# Patient Record
Sex: FEMALE | Race: WHITE | Employment: PART TIME | URBAN - METROPOLITAN AREA
[De-identification: names, ages, dates, MRNs, and addresses within clinical notes are randomized per-mention and may not be internally consistent; named-entity substitution may affect disease eponyms.]

---

## 2017-07-24 RX ORDER — LIOTHYRONINE SODIUM 5 UG/1
5 TABLET ORAL 2 TIMES DAILY
Qty: 180 TAB | Refills: 3 | OUTPATIENT
Start: 2017-07-24

## 2017-07-24 RX ORDER — LIOTHYRONINE SODIUM 5 UG/1
TABLET ORAL
Qty: 180 TAB | Refills: 3 | Status: SHIPPED | OUTPATIENT
Start: 2017-07-24 | End: 2018-07-06 | Stop reason: SDUPTHER

## 2017-07-24 NOTE — TELEPHONE ENCOUNTER
From: Ko Cool  To: Dee Banda MD  Sent: 7/23/2017 9:43 PM EDT  Subject: Medication Renewal Request    Original authorizing provider: MD Mateus Horton.  [place] would like a refill of the following medications:  liothyronine (CYTOMEL) 5 mcg tablet Dee Banda MD]    Preferred pharmacy: [address] [place], [place] [address]n [facility] 76:

## 2017-10-18 ENCOUNTER — OFFICE VISIT (OUTPATIENT)
Dept: FAMILY MEDICINE CLINIC | Age: 42
End: 2017-10-18

## 2017-10-18 ENCOUNTER — TELEPHONE (OUTPATIENT)
Dept: FAMILY MEDICINE CLINIC | Age: 42
End: 2017-10-18

## 2017-10-18 VITALS
OXYGEN SATURATION: 99 % | HEIGHT: 61 IN | WEIGHT: 181 LBS | HEART RATE: 68 BPM | SYSTOLIC BLOOD PRESSURE: 110 MMHG | BODY MASS INDEX: 34.17 KG/M2 | DIASTOLIC BLOOD PRESSURE: 77 MMHG | TEMPERATURE: 98.2 F

## 2017-10-18 DIAGNOSIS — M62.838 TRAPEZIUS MUSCLE SPASM: ICD-10-CM

## 2017-10-18 DIAGNOSIS — M79.18 MYOFASCIAL PAIN: Primary | ICD-10-CM

## 2017-10-18 DIAGNOSIS — G44.209 TENSION-TYPE HEADACHE, NOT INTRACTABLE, UNSPECIFIED CHRONICITY PATTERN: ICD-10-CM

## 2017-10-18 DIAGNOSIS — M54.50 BILATERAL LOW BACK PAIN WITHOUT SCIATICA, UNSPECIFIED CHRONICITY: ICD-10-CM

## 2017-10-18 DIAGNOSIS — M62.838 NECK MUSCLE SPASM: ICD-10-CM

## 2017-10-18 RX ORDER — CYCLOBENZAPRINE HCL 5 MG
5 TABLET ORAL
Qty: 90 TAB | Refills: 2 | Status: SHIPPED | OUTPATIENT
Start: 2017-10-18 | End: 2017-10-18 | Stop reason: SDUPTHER

## 2017-10-18 RX ORDER — CYCLOBENZAPRINE HCL 5 MG
5 TABLET ORAL
Qty: 90 TAB | Refills: 2 | Status: SHIPPED | OUTPATIENT
Start: 2017-10-18 | End: 2018-04-19

## 2017-10-18 NOTE — TELEPHONE ENCOUNTER
Patient says that [address] [place] [address] has not got the flexeril that was supposed to be called in this morning.  She can be reached at  [phone]

## 2017-10-18 NOTE — PROGRESS NOTES
HPI  Sebas Gomez is a 42 y.o. female who presents with headache, neck pain, trapezius pain that has been bothering her for the last week. She has checked her blood pressure a few times during these episodes and notices that they are 150/90 and that specifically is what she is concerned about. She has had migraines in the past.  These are typically on the left side and with visual sensitivity and nausea. No aura to speak of. Has been taking propranolol for several years and now only has migraines a few times per year but is not sure when she had the last one. This headache that she is currently complaining about started about a week ago when her menstrual cycle started. Patient describes it as bilateral and viselike. Points to the bilateral trapezius and the back of the neck and says that it starts there and moves up over the top of her head and then makes a constricting heel motion around the sides of her head. Also notes that every now and then her ears would feel tingling. The pattern of the headache comes and goes. Will come on for about 23 hours and go away for about 30 minutes. If she sitting on the couch and not moving she can get the headache to go away. Once she gets up and gets moving again the headache comes back on. Also notices that it is worse with stress and when she is busy at work. Advil did not make much of a difference but ice on the top of her head and going to sleep did help. She has been able to sleep. There are other symptoms that she brought up. She notices that when she squats with her bottom to her heels for an extended period of time and then stands up she will get lightheaded as though the blood is rushing to her feet. This is not typical for her but has only happened 2 times in the past week. She also notices that there is a sort of swishing sound in her ears every now and then with certain head movements.     Tells me that she has been under an unusual amount of stress recently. Her mother  about this time last year and 2 weeks ago she lost a friend. She has a history of postpartum depression and was on SSRI at some point but was weaned off of it (on her own). She cannot recall which medication but does recall that she had been on clonazepam at one point. Has not had any medications for decades. She does have a history of bilateral low back discomfort that is almost always there and will occasionally tingle to her anterior thigh. Notices that this also got worse about the same time as the headache. Has been given Flexeril in the past for this and she took 5 mg Flexeril last night and that helped her sleep. Has a family history of all the women in her family having fibromyalgia    PMHx:  Past Medical History:   Diagnosis Date    HTN (hypertension)     Migraine     Nausea & vomiting     Thyroid disease     hypothyroid       Meds:   Current Outpatient Prescriptions   Medication Sig Dispense Refill    cyclobenzaprine (FLEXERIL) 5 mg tablet Take 1 Tab by mouth three (3) times daily as needed for Muscle Spasm(s). 90 Tab 2    liothyronine (CYTOMEL) 5 mcg tablet TAKE 2 TABLETS DAILY 180 Tab 3    levothyroxine (SYNTHROID) 50 mcg tablet TAKE 1 TABLET DAILY BEFORE BREAKFAST 90 Tab 2    propranolol LA (INDERAL LA) 60 mg SR capsule Take 1 Cap by mouth daily. 30 Cap 10    lansoprazole (PREVACID) 15 mg capsule Take 1 Cap by mouth Daily (before breakfast). 30 Cap 5    vitamin e 800 unit capsule Take  by mouth daily.  CHOLECALCIFEROL, VITAMIN D3, (VITAMIN D3 PO) Take  by mouth daily. Allergies:    Allergies   Allergen Reactions    Codeine Nausea Only    Morphine Itching and Nausea and Vomiting    Percocet [Oxycodone-Acetaminophen] Nausea and Vomiting    Sulfa (Sulfonamide Antibiotics) Itching    Vicodin [Hydrocodone-Acetaminophen] Nausea and Vomiting       Smoker:  History   Smoking Status    Former Smoker    Packs/day: 0.50    Years: 4.00   Smokeless Tobacco    Never Used     Comment: no vaping       ETOH:   History   Alcohol Use No       FH:   Family History   Problem Relation Age of Onset    Cancer Paternal Grandfather     Stroke Paternal Grandfather     Cancer Maternal Grandmother     Thyroid Disease Maternal Grandmother     Hypertension Maternal Grandfather     Hypertension Father     Hypertension Mother        ROS:   As listed in HPI. In addition:  Constitutional:   No headache, fever, fatigue, weight loss or weight gain      Cardiac:    No chest pain      Resp:   No cough or shortness of breath      Neuro   No loss of consciousness, dizziness, seizures      Physical Exam:  Blood pressure 110/77, pulse 68, temperature 98.2 °F (36.8 °C), temperature source Oral, height 5' 1.42\" (1.56 m), weight 181 lb (82.1 kg), last menstrual period 10/09/2017, SpO2 99 %. GEN: No apparent distress. Alert and oriented and responds to all questions appropriately. NEUROLOGIC:  No focal neurologic deficits. Strength and sensation grossly intact. Coordination and gait grossly intact. EXT: Well perfused. No edema. SKIN: No obvious rashes. HEENT unremarkable  CV regular rate and rhythm no murmur  Lungs clear to auscultation bilaterally  Sternocleidomastoid muscle spasm, scalene muscle spasm that when palpated reproduces the \"tingling\" around her ears, splenius capitis and exhibits recti muscle spasms bilaterally reproduce frontal headache and top of head headache. Lateral trapezius muscle spasms are exquisitely tender to light touch. Working down the back has to L5 paraspinal trigger points that are very tender to light touch and working out laterally there are a total of 6 trigger points in this area. Also SI trigger points bilaterally. .  A total of 10 tender points       Assessment and Plan     Tension type headache that has been bothering her for about a week is doing a little bit better now.   She has myofascial pain and a family history of fibromyalgia although does not quite meet the criteria today. Provided extensive education as well as stretches and exercises that can be done on the neck and trapezius muscles to help with this. For immediate relief centrally acting muscle relaxers will be our best option but we also talked about adjuvant therapy such as yoga and meditation. This sounds like this episode was triggered by an increased stressful tone on about the deaths of close friends and family members. Encourage her to come back if she feels like the burden of the stress is not lifting and we can consider medication such as Cymbalta but she would like to avoid chronic medication at this point. Use Flexeril only after work and at night to help with sleep. Try to stay active. Get lots of cardiovascular exercise. Follow-up as needed      ICD-10-CM ICD-9-CM    1. Myofascial pain M79.1 729.1 cyclobenzaprine (FLEXERIL) 5 mg tablet   2. Tension-type headache, not intractable, unspecified chronicity pattern G44.209 339.10    3. Bilateral low back pain without sciatica, unspecified chronicity M54.5 724.2    4. Trapezius muscle spasm M62.838 728.85    5. Neck muscle spasm M62.838 728.85        AVS given. Pt expressed understanding of instructions    This was a 50 -minute visit. Greater than 50% of the time was spent counseling the patient on pain syndrome and tension type headache.

## 2017-10-18 NOTE — PROGRESS NOTES
Chief Complaint   Patient presents with    New Patient     Patient is here to establish new PCP. Patients BP was elevated last week. Patient has frequent headaches.

## 2017-10-27 ENCOUNTER — OFFICE VISIT (OUTPATIENT)
Dept: FAMILY MEDICINE CLINIC | Age: 42
End: 2017-10-27

## 2017-10-27 VITALS
RESPIRATION RATE: 18 BRPM | TEMPERATURE: 98.3 F | BODY MASS INDEX: 34.06 KG/M2 | SYSTOLIC BLOOD PRESSURE: 123 MMHG | HEART RATE: 76 BPM | WEIGHT: 180.4 LBS | HEIGHT: 61 IN | DIASTOLIC BLOOD PRESSURE: 83 MMHG | OXYGEN SATURATION: 98 %

## 2017-10-27 DIAGNOSIS — R39.9 URINARY SYMPTOM OR SIGN: Primary | ICD-10-CM

## 2017-10-27 DIAGNOSIS — N39.0 URINARY TRACT INFECTION WITHOUT HEMATURIA, SITE UNSPECIFIED: ICD-10-CM

## 2017-10-27 LAB
BILIRUB UR QL STRIP: NEGATIVE
GLUCOSE UR-MCNC: NEGATIVE MG/DL
KETONES P FAST UR STRIP-MCNC: NEGATIVE MG/DL
PH UR STRIP: 6 [PH] (ref 4.6–8)
PROT UR QL STRIP: NEGATIVE MG/DL
SP GR UR STRIP: 1.02 (ref 1–1.03)
UA UROBILINOGEN AMB POC: NORMAL (ref 0.2–1)
URINALYSIS CLARITY POC: NORMAL
URINALYSIS COLOR POC: YELLOW
URINE BLOOD POC: NORMAL
URINE LEUKOCYTES POC: NORMAL
URINE NITRITES POC: NEGATIVE

## 2017-10-27 RX ORDER — NITROFURANTOIN 25; 75 MG/1; MG/1
100 CAPSULE ORAL 2 TIMES DAILY
Qty: 10 CAP | Refills: 0 | Status: SHIPPED | OUTPATIENT
Start: 2017-10-27 | End: 2017-11-01

## 2017-10-27 NOTE — PROGRESS NOTES
Chief Complaint   Patient presents with    Dysuria     Started on 10/26    Urinary Frequency    Urgency     1. Have you been to the ER, urgent care clinic since your last visit? Hospitalized since your last visit? No    2. Have you seen or consulted any other health care providers outside of the [address] [place] [address] since your last visit? Include any pap smears or colon screening.  No

## 2017-10-27 NOTE — MR AVS SNAPSHOT
Visit Information Date & Time Provider Department Dept. Phone Encounter #  
 10/27/2017  2:00 PM Pat Car, [address] [place] [address] [phone] [id number] Upcoming Health Maintenance Date Due DTaP/Tdap/Td series (1 - Tdap) 10/21/1995 PAP AKA CERVICAL CYTOLOGY 10/21/1995 Allergies as of 10/27/2017  Review Complete On: 10/27/2017 By: Pat Car NP Severity Noted Reaction Type Reactions Codeine  02/04/2011    Nausea Only Morphine  11/14/2014    Itching, Nausea and Vomiting Percocet [Oxycodone-acetaminophen]  08/05/2011    Nausea and Vomiting  
 Sulfa (Sulfonamide Antibiotics)  02/04/2011    Itching Vicodin [Hydrocodone-acetaminophen]  11/14/2014    Nausea and Vomiting Current Immunizations  Never Reviewed No immunizations on file. Not reviewed this visit You Were Diagnosed With   
  
 Codes Comments Urinary symptom or sign    -  Primary ICD-10-CM: R39.9 ICD-9-CM: 788.99 Urinary tract infection without hematuria, site unspecified     ICD-10-CM: N39.0 ICD-9-CM: 599.0 Vitals BP Pulse Temp Resp Height(growth percentile) Weight(growth percentile) (!) 138/96 (BP 1 Location: Left arm, BP Patient Position: Sitting) 76 98.3 °F (36.8 °C) (Oral) 18 5' 1.42\" (1.56 m) 180 lb 6.4 oz (81.8 kg) LMP SpO2 BMI OB Status Smoking Status 10/09/2017 98% 33.62 kg/m2 Having regular periods Former Smoker Vitals History BMI and BSA Data Body Mass Index Body Surface Area  
 33.62 kg/m 2 1.88 m 2 Preferred Pharmacy Pharmacy Name Phone [facility] [address] [phone] Your Updated Medication List  
  
   
This list is accurate as of: 10/27/17  3:07 PM.  Always use your most recent med list.  
  
  
  
  
 cyclobenzaprine 5 mg tablet Commonly known as:  FLEXERIL Take 1 Tab by mouth three (3) times daily as needed for Muscle Spasm(s). lansoprazole 15 mg capsule Commonly known as:  PREVACID Take 1 Cap by mouth Daily (before breakfast). levothyroxine 50 mcg tablet Commonly known as:  SYNTHROID  
TAKE 1 TABLET DAILY BEFORE BREAKFAST  
  
 liothyronine 5 mcg tablet Commonly known as:  CYTOMEL  
TAKE 2 TABLETS DAILY  
  
 nitrofurantoin (macrocrystal-monohydrate) 100 mg capsule Commonly known as:  MACROBID Take 1 Cap by mouth two (2) times a day for 5 days. propranolol LA 60 mg SR capsule Commonly known as:  INDERAL LA Take 1 Cap by mouth daily. VITAMIN D3 PO Take  by mouth daily. vitamin e 800 unit capsule Take  by mouth daily. Prescriptions Sent to Pharmacy Refills  
 nitrofurantoin, macrocrystal-monohydrate, (MACROBID) 100 mg capsule 0 Sig: Take 1 Cap by mouth two (2) times a day for 5 days. Class: Normal  
 Pharmacy: [address] #: [phone] Route: Oral  
  
We Performed the Following AMB POC URINALYSIS DIP STICK AUTO W/O MICRO [60974 CPT(R)] CULTURE, URINE [id number] CPT(R)] Patient Instructions Urinary Tract Infection in Women: Care Instructions Your Care Instructions A urinary tract infection, or UTI, is a general term for an infection anywhere between the kidneys and the urethra (where urine comes out). Most UTIs are bladder infections. They often cause pain or burning when you urinate. UTIs are caused by bacteria and can be cured with antibiotics. Be sure to complete your treatment so that the infection goes away. Follow-up care is a key part of your treatment and safety. Be sure to make and go to all appointments, and call your doctor if you are having problems. It's also a good idea to know your test results and keep a list of the medicines you take. How can you care for yourself at home? · Take your antibiotics as directed.  Do not stop taking them just because you feel better. You need to take the full course of antibiotics. · Drink extra water and other fluids for the next day or two. This may help wash out the bacteria that are causing the infection. (If you have kidney, heart, or liver disease and have to limit fluids, talk with your doctor before you increase your fluid intake.) · Avoid drinks that are carbonated or have caffeine. They can irritate the bladder. · Urinate often. Try to empty your bladder each time. · To relieve pain, take a hot bath or lay a heating pad set on low over your lower belly or genital area. Never go to sleep with a heating pad in place. To prevent UTIs · Drink plenty of water each day. This helps you urinate often, which clears bacteria from your system. (If you have kidney, heart, or liver disease and have to limit fluids, talk with your doctor before you increase your fluid intake.) · Urinate when you need to. · Urinate right after you have sex. · Change sanitary pads often. · Avoid douches, bubble baths, feminine hygiene sprays, and other feminine hygiene products that have deodorants. · After going to the bathroom, wipe from front to back. When should you call for help? Call your doctor now or seek immediate medical care if: 
? · Symptoms such as fever, chills, nausea, or vomiting get worse or appear for the first time. ? · You have new pain in your back just below your rib cage. This is called flank pain. ? · There is new blood or pus in your urine. ? · You have any problems with your antibiotic medicine. ? Watch closely for changes in your health, and be sure to contact your doctor if: 
? · You are not getting better after taking an antibiotic for 2 days. ? · Your symptoms go away but then come back. Where can you learn more? Go to http://saray-leydi.info/. Enter M685 in the search box to learn more about \"Urinary Tract Infection in Women: Care Instructions. \" Current as of: May 12, 2017 Content Version: 11.4 © 2240-9167 Healthwise, [employer]. Care instructions adapted under license by [employer] (which disclaims liability or warranty for this information). If you have questions about a medical condition or this instruction, always ask your healthcare professional. [address] any warranty or liability for your use of this information. Introducing [facility]! Dear [facility]: 
Thank you for requesting a [employer] account. Our records indicate that you already have an active [employer] account. You can access your account anytime at [email or web address] Did you know that you can access your hospital and ER discharge instructions at any time in [employer]? You can also review all of your test results from your hospital stay or ER visit. Additional Information If you have questions, please visit the Frequently Asked Questions section of the [employer] website at [email or web address] Remember, [employer] is NOT to be used for urgent needs. For medical emergencies, dial 911. Now available from your iPhone and Android! Please provide this summary of care documentation to your next provider. Your primary care clinician is listed as [facility]. If you have any questions after today's visit, please call [phone].

## 2017-10-27 NOTE — PROGRESS NOTES
Subjective:      Chief Complaint   Patient presents with    Dysuria     Started on 10/26    Urinary Frequency    Urgency       Leodan Byrnes is a 36 y.o. female  that presents today with a chief complaint of dysuria. The patient admits to accompanying urgency, frequency and suprapubic pain; all symptoms started at 0500 yesterday morning. The patient denies nausea, vomiting, and fever. Had a UTI about 5-6 months ago and symptoms feel same. She started taking Azo cranberry and Cystex OTC Urinary pills. Past Medical History:   Diagnosis Date    HTN (hypertension)     Migraine     Nausea & vomiting     Thyroid disease     hypothyroid     Current Outpatient Prescriptions   Medication Sig    cyclobenzaprine (FLEXERIL) 5 mg tablet Take 1 Tab by mouth three (3) times daily as needed for Muscle Spasm(s).  liothyronine (CYTOMEL) 5 mcg tablet TAKE 2 TABLETS DAILY    levothyroxine (SYNTHROID) 50 mcg tablet TAKE 1 TABLET DAILY BEFORE BREAKFAST    propranolol LA (INDERAL LA) 60 mg SR capsule Take 1 Cap by mouth daily.  lansoprazole (PREVACID) 15 mg capsule Take 1 Cap by mouth Daily (before breakfast).  CHOLECALCIFEROL, VITAMIN D3, (VITAMIN D3 PO) Take  by mouth daily.  vitamin e 800 unit capsule Take  by mouth daily. No current facility-administered medications for this visit.       Allergies   Allergen Reactions    Codeine Nausea Only    Morphine Itching and Nausea and Vomiting    Percocet [Oxycodone-Acetaminophen] Nausea and Vomiting    Sulfa (Sulfonamide Antibiotics) Itching    Vicodin [Hydrocodone-Acetaminophen] Nausea and Vomiting     Social History   Substance Use Topics    Smoking status: Former Smoker     Packs/day: 0.50     Years: 4.00    Smokeless tobacco: Never Used      Comment: no vaping    Alcohol use No       ROS:  Gen: no fatigue, fever, chills  Eyes: no excessive tearing, itching, or discharge  Nose: no rhinorrhea, no sinus pain  Mouth: no oral lesions, no sore throat  Resp: no shortness of breath, no wheezing, no cough  CV: no chest pain, no paroxysmal nocturnal dyspnea  Abd: no nausea, no heartburn, no diarrhea, no constipation, no abdominal pain  Neuro: no headaches, no syncope or presyncopal episodes  Heme: no lymphadenopathy, no easy bruising or bleeding      Objective:     Visit Vitals    BP (!) 138/96 (BP 1 Location: Left arm, BP Patient Position: Sitting)    Pulse 76    Temp 98.3 °F (36.8 °C) (Oral)    Resp 18    Ht 5' 1.42\" (1.56 m)    Wt 180 lb 6.4 oz (81.8 kg)    LMP 10/09/2017    SpO2 98%    BMI 33.62 kg/m2       Skin: no pallor, normal turgor  HEENT:  Normocephalic, no conjunctival inflammation, pupils equal round and reactive to light, MMM, no oral lesions  Heart: regular rate and rhythm, no murmurs, rubs or gallops  Lungs: no increased respiratory effort, clear to ausculation bilaterally  Abdomen: soft, mild suprapubic tenderness, not distended. Normal bowel sounds. No rebound or guarding. No bruits. Back: no costovertebral angle tenderness  Extremities:no edema or cyanosis  Neuro awake, alert and oriented    Results for orders placed or performed in visit on 08/16/12   AMB POC URINALYSIS DIP STICK AUTO W/O MICRO     Status: None   Result Value Ref Range Status    Color (UA POC) Yellow (none) Final    Clarity (UA POC) Cloudy (none) Final    Glucose (UA POC) Negative (none) Final    Bilirubin (UA POC) Negative (none) Final    Ketones (UA POC) Negative (none) Final    Specific gravity (UA POC) 1.020 1.001 - 1.035 Final    Blood (UA POC) Negative (none) Final    pH (UA POC) 7.5 4.6 - 8.0 Final    Protein (UA POC) negative Negative mg/dL Final    Urobilinogen (UA POC) 0.2 mg/dL  Final    Nitrites (UA POC) Negative (none) Final    Leukocyte esterase (UA POC) Trace (none) Final         Assessment/Plan:   Differential diagnosis and treatment options reviewed with patient who is in agreement with treatment plan as outlined below.       ICD-10-CM ICD-9-CM 1. Urinary symptom or sign R39.9 788.99 AMB POC URINALYSIS DIP STICK AUTO W/O MICRO      CULTURE, URINE      nitrofurantoin, macrocrystal-monohydrate, (MACROBID) 100 mg capsule   2. Urinary tract infection without hematuria, site unspecified N39.0 599.0 CULTURE, URINE      nitrofurantoin, macrocrystal-monohydrate, (MACROBID) 100 mg capsule       Will send Urine for UC. Antibiotics prescribed and medication profile discussed. Will call/message patient with UC results. Increase water hydration. Discussed UTI prevention. Discussed BMI and healthy weight. Encouraged patient to work to implement changes including diet high in raw fruits and vegetables, lean protein and good fats. Limit refined, processed carbohydrates and sugar. Encouraged regular exercise. Verbal and written instructions (see AVS) provided. Patient expresses understanding of diagnosis and treatment plan.

## 2017-10-27 NOTE — PATIENT INSTRUCTIONS

## 2017-10-31 LAB
BACTERIA UR CULT: ABNORMAL
BACTERIA UR CULT: ABNORMAL

## 2017-11-01 NOTE — PROGRESS NOTES
Sent to [employer]:  Hi Ms Luisa Fuentes,  Your urine culture shows that the Macrobid should kill bacteria. Let me know if you have any other questions or concerns.   [facility]

## 2017-12-06 ENCOUNTER — OFFICE VISIT (OUTPATIENT)
Dept: FAMILY MEDICINE CLINIC | Age: 42
End: 2017-12-06

## 2017-12-06 VITALS
TEMPERATURE: 98.5 F | RESPIRATION RATE: 16 BRPM | HEIGHT: 61 IN | DIASTOLIC BLOOD PRESSURE: 84 MMHG | HEART RATE: 89 BPM | WEIGHT: 178 LBS | SYSTOLIC BLOOD PRESSURE: 142 MMHG | BODY MASS INDEX: 33.61 KG/M2 | OXYGEN SATURATION: 98 %

## 2017-12-06 DIAGNOSIS — R52 BODY ACHES: Primary | ICD-10-CM

## 2017-12-06 DIAGNOSIS — R50.9 FEVER, UNSPECIFIED FEVER CAUSE: ICD-10-CM

## 2017-12-06 DIAGNOSIS — J34.89 NASAL DRAINAGE: ICD-10-CM

## 2017-12-06 DIAGNOSIS — J34.89 SINUS PRESSURE: ICD-10-CM

## 2017-12-06 DIAGNOSIS — J02.9 SORE THROAT: ICD-10-CM

## 2017-12-06 LAB
FLUAV+FLUBV AG NOSE QL IA.RAPID: NEGATIVE POS/NEG
FLUAV+FLUBV AG NOSE QL IA.RAPID: NEGATIVE POS/NEG
S PYO AG THROAT QL: NEGATIVE
VALID INTERNAL CONTROL?: YES
VALID INTERNAL CONTROL?: YES

## 2017-12-06 RX ORDER — AMOXICILLIN AND CLAVULANATE POTASSIUM 875; 125 MG/1; MG/1
1 TABLET, FILM COATED ORAL EVERY 12 HOURS
Qty: 20 TAB | Refills: 0 | Status: SHIPPED | OUTPATIENT
Start: 2017-12-06 | End: 2017-12-16

## 2017-12-06 NOTE — MR AVS SNAPSHOT
Visit Information Date & Time Provider Department Dept. Phone Encounter #  
 12/6/2017  2:00 PM Leslie Victor, [address] [address] [phone] [id number] Upcoming Health Maintenance Date Due DTaP/Tdap/Td series (1 - Tdap) 10/21/1995 PAP AKA CERVICAL CYTOLOGY 10/21/1995 Allergies as of 12/6/2017  Review Complete On: 12/6/2017 By: Leslie Victor NP Severity Noted Reaction Type Reactions Codeine  02/04/2011    Nausea Only Morphine  11/14/2014    Itching, Nausea and Vomiting Percocet [Oxycodone-acetaminophen]  08/05/2011    Nausea and Vomiting  
 Sulfa (Sulfonamide Antibiotics)  02/04/2011    Itching Vicodin [Hydrocodone-acetaminophen]  11/14/2014    Nausea and Vomiting Current Immunizations  Never Reviewed No immunizations on file. Not reviewed this visit You Were Diagnosed With   
  
 Codes Comments Body aches    -  Primary ICD-10-CM: H45 ICD-9-CM: 780.96 Sore throat     ICD-10-CM: J02.9 ICD-9-CM: 445 Sinus pressure     ICD-10-CM: J34.89 ICD-9-CM: 478.19 Fever, unspecified fever cause     ICD-10-CM: R50.9 ICD-9-CM: 780.60 Nasal drainage     ICD-10-CM: J34.89 ICD-9-CM: 478.19 Vitals BP Pulse Temp Resp Height(growth percentile) Weight(growth percentile) 142/84 (BP 1 Location: Left arm, BP Patient Position: Sitting) 89 98.5 °F (36.9 °C) (Oral) 16 5' 1.42\" (1.56 m) 178 lb (80.7 kg) LMP SpO2 BMI OB Status Smoking Status 12/01/2017 98% 33.17 kg/m2 Having regular periods Former Smoker Vitals History BMI and BSA Data Body Mass Index Body Surface Area  
 33.17 kg/m 2 1.87 m 2 Preferred Pharmacy Pharmacy Name Phone [facility] [address] [phone] Your Updated Medication List  
  
   
This list is accurate as of: 12/6/17  2:56 PM.  Always use your most recent med list.  
  
  
  
  
 amoxicillin-clavulanate 875-125 mg per tablet Commonly known as:  AUGMENTIN Take 1 Tab by mouth every twelve (12) hours for 10 days. cyclobenzaprine 5 mg tablet Commonly known as:  FLEXERIL Take 1 Tab by mouth three (3) times daily as needed for Muscle Spasm(s).  
  
 lansoprazole 15 mg capsule Commonly known as:  PREVACID Take 1 Cap by mouth Daily (before breakfast). levothyroxine 50 mcg tablet Commonly known as:  SYNTHROID  
TAKE 1 TABLET DAILY BEFORE BREAKFAST  
  
 liothyronine 5 mcg tablet Commonly known as:  CYTOMEL  
TAKE 2 TABLETS DAILY propranolol LA 60 mg SR capsule Commonly known as:  INDERAL LA Take 1 Cap by mouth daily. VITAMIN D3 PO Take  by mouth daily. vitamin e 800 unit capsule Take  by mouth daily. Prescriptions Printed Refills  
 amoxicillin-clavulanate (AUGMENTIN) 875-125 mg per tablet 0 Sig: Take 1 Tab by mouth every twelve (12) hours for 10 days. Class: Print Route: Oral  
  
We Performed the Following AMB POC RAPID STREP A [89245 CPT(R)] AMB POC KEISHA INFLUENZA A/B TEST [16796 CPT(R)] Patient Instructions Saline Nasal Washes: Care Instructions Your Care Instructions Saline nasal washes help keep the nasal passages open by washing out thick or dried mucus. This simple remedy can help relieve symptoms of allergies, sinusitis, and colds. It also can make the nose feel more comfortable by keeping the mucous membranes moist. You may notice a little burning sensation in your nose the first few times you use the solution, but this usually gets better in a few days. Follow-up care is a key part of your treatment and safety. Be sure to make and go to all appointments, and call your doctor if you are having problems. It's also a good idea to know your test results and keep a list of the medicines you take. How can you care for yourself at home? · You can buy premixed saline solution in a squeeze bottle or other sinus rinse products at a drugstore. Read and follow the instructions on the label. · You also can make your own saline solution by adding 1 teaspoon of salt and 1 teaspoon of baking soda to 2 cups of distilled water. · If you use a homemade solution, pour a small amount into a clean bowl. Using a rubber bulb syringe, squeeze the syringe and place the tip in the salt water. Pull a small amount of the salt water into the syringe by relaxing your hand. · Sit down with your head tilted slightly back. Do not lie down. Put the tip of the bulb syringe or the squeeze bottle a little way into one of your nostrils. Gently drip or squirt a few drops into the nostril. Repeat with the other nostril. Some sneezing and gagging are normal at first. 
· Gently blow your nose. · Wipe the syringe or bottle tip clean after each use. · Repeat this 2 or 3 times a day. · Use nasal washes gently if you have nosebleeds often. When should you call for help? Watch closely for changes in your health, and be sure to contact your doctor if: 
? · You often get nosebleeds. ? · You have problems doing the nasal washes. Where can you learn more? Go to http://saray-leydi.info/. Enter [id number] in the search box to learn more about \"Saline Nasal Washes: Care Instructions. \" Current as of: May 12, 2017 Content Version: 11.4 © 4973-8710 [employer]. Care instructions adapted under license by [employer] (which disclaims liability or warranty for this information). If you have questions about a medical condition or this instruction, always ask your healthcare professional. [address] [address] any warranty or liability for your use of this information. Sinusitis: Care Instructions Your Care Instructions Sinusitis is an infection of the lining of the sinus cavities in your head. Sinusitis often follows a cold. It causes pain and pressure in your head and face. In most cases, sinusitis gets better on its own in 1 to 2 weeks. But some mild symptoms may last for several weeks. Sometimes antibiotics are needed. Follow-up care is a key part of your treatment and safety. Be sure to make and go to all appointments, and call your doctor if you are having problems. It's also a good idea to know your test results and keep a list of the medicines you take. How can you care for yourself at home? · Take an over-the-counter pain medicine, such as acetaminophen (Tylenol), ibuprofen (Advil, Motrin), or naproxen (Aleve). Read and follow all instructions on the label. · If the doctor prescribed antibiotics, take them as directed. Do not stop taking them just because you feel better. You need to take the full course of antibiotics. · Be careful when taking over-the-counter cold or flu medicines and Tylenol at the same time. Many of these medicines have acetaminophen, which is Tylenol. Read the labels to make sure that you are not taking more than the recommended dose. Too much acetaminophen (Tylenol) can be harmful. · Breathe warm, moist air from a steamy shower, a hot bath, or a sink filled with hot water. Avoid cold, dry air. Using a humidifier in your home may help. Follow the directions for cleaning the machine. · Use saline (saltwater) nasal washes to help keep your nasal passages open and wash out mucus and bacteria. You can buy saline nose drops at a grocery store or drugstore. Or you can make your own at home by adding 1 teaspoon of salt and 1 teaspoon of baking soda to 2 cups of distilled water. If you make your own, fill a bulb syringe with the solution, insert the tip into your nostril, and squeeze gently. Sydnee Hals your nose. · Put a hot, wet towel or a warm gel pack on your face 3 or 4 times a day for 5 to 10 minutes each time. · Try a decongestant nasal spray like oxymetazoline (Afrin). Do not use it for more than 3 days in a row. Using it for more than 3 days can make your congestion worse. When should you call for help? Call your doctor now or seek immediate medical care if: 
? · You have new or worse swelling or redness in your face or around your eyes. ? · You have a new or higher fever. ? Watch closely for changes in your health, and be sure to contact your doctor if: 
? · You have new or worse facial pain. ? · The mucus from your nose becomes thicker (like pus) or has new blood in it. ? · You are not getting better as expected. Where can you learn more? Go to http://saray-leydi.info/. Enter S545 in the search box to learn more about \"Sinusitis: Care Instructions. \" Current as of: May 12, 2017 Content Version: 11.4 © 9054-6420 [employer]. Care instructions adapted under license by [employer] ([employer]: [place]) (which disclaims liability or warranty for this information). If you have questions about a medical condition or this instruction, always ask your healthcare professional. [address] [place] [address] any warranty or liability for your use of this information. Introducing [facility]! Dear Krystal Neal: 
Thank you for requesting a [facility] account. Our records indicate that you already have an active [facility] account. You can access your account anytime at [email or web address] Did you know that you can access your hospital and ER discharge instructions at any time in [facility]? You can also review all of your test results from your hospital stay or ER visit. Additional Information If you have questions, please visit the Frequently Asked Questions section of the [facility] website at [email or web address] Remember, [facility] is NOT to be used for urgent needs. For medical emergencies, dial 911. Now available from your iPhone and Android! Please provide this summary of care documentation to your next provider. Your primary care clinician is listed as Maryann Webber. If you have any questions after today's visit, please call [phone].

## 2017-12-06 NOTE — PATIENT INSTRUCTIONS
Saline Nasal Washes: Care Instructions  Your Care Instructions  Saline nasal washes help keep the nasal passages open by washing out thick or dried mucus. This simple remedy can help relieve symptoms of allergies, sinusitis, and colds. It also can make the nose feel more comfortable by keeping the mucous membranes moist. You may notice a little burning sensation in your nose the first few times you use the solution, but this usually gets better in a few days. Follow-up care is a key part of your treatment and safety. Be sure to make and go to all appointments, and call your doctor if you are having problems. It's also a good idea to know your test results and keep a list of the medicines you take. How can you care for yourself at home? · You can buy premixed saline solution in a squeeze bottle or other sinus rinse products at a drugstore. Read and follow the instructions on the label. · You also can make your own saline solution by adding 1 teaspoon of salt and 1 teaspoon of baking soda to 2 cups of distilled water. · If you use a homemade solution, pour a small amount into a clean bowl. Using a rubber bulb syringe, squeeze the syringe and place the tip in the salt water. Pull a small amount of the salt water into the syringe by relaxing your hand. · Sit down with your head tilted slightly back. Do not lie down. Put the tip of the bulb syringe or the squeeze bottle a little way into one of your nostrils. Gently drip or squirt a few drops into the nostril. Repeat with the other nostril. Some sneezing and gagging are normal at first.  · Gently blow your nose. · Wipe the syringe or bottle tip clean after each use. · Repeat this 2 or 3 times a day. · Use nasal washes gently if you have nosebleeds often. When should you call for help? Watch closely for changes in your health, and be sure to contact your doctor if:  ? · You often get nosebleeds. ? · You have problems doing the nasal washes.    Where can you learn more? Go to http://saray-leydi.info/. Enter [id number] in the search box to learn more about \"Saline Nasal Washes: Care Instructions. \"  Current as of: May 12, 2017  Content Version: 11.4  © 1255-2956 [employer]. Care instructions adapted under license by [employer] (which disclaims liability or warranty for this information). If you have questions about a medical condition or this instruction, always ask your healthcare professional. [address] any warranty or liability for your use of this information. Sinusitis: Care Instructions  Your Care Instructions    Sinusitis is an infection of the lining of the sinus cavities in your head. Sinusitis often follows a cold. It causes pain and pressure in your head and face. In most cases, sinusitis gets better on its own in 1 to 2 weeks. But some mild symptoms may last for several weeks. Sometimes antibiotics are needed. Follow-up care is a key part of your treatment and safety. Be sure to make and go to all appointments, and call your doctor if you are having problems. It's also a good idea to know your test results and keep a list of the medicines you take. How can you care for yourself at home? · Take an over-the-counter pain medicine, such as acetaminophen (Tylenol), ibuprofen (Advil, Motrin), or naproxen (Aleve). Read and follow all instructions on the label. · If the doctor prescribed antibiotics, take them as directed. Do not stop taking them just because you feel better. You need to take the full course of antibiotics. · Be careful when taking over-the-counter cold or flu medicines and Tylenol at the same time. Many of these medicines have acetaminophen, which is Tylenol. Read the labels to make sure that you are not taking more than the recommended dose. Too much acetaminophen (Tylenol) can be harmful.   · Breathe warm, moist air from a steamy shower, a hot bath, or a sink filled with hot water. Avoid cold, dry air. Using a humidifier in your home may help. Follow the directions for cleaning the machine. · Use saline (saltwater) nasal washes to help keep your nasal passages open and wash out mucus and bacteria. You can buy saline nose drops at a grocery store or drugstore. Or you can make your own at home by adding 1 teaspoon of salt and 1 teaspoon of baking soda to 2 cups of distilled water. If you make your own, fill a bulb syringe with the solution, insert the tip into your nostril, and squeeze gently. Marleta Hopper your nose. · Put a hot, wet towel or a warm gel pack on your face 3 or 4 times a day for 5 to 10 minutes each time. · Try a decongestant nasal spray like oxymetazoline (Afrin). Do not use it for more than 3 days in a row. Using it for more than 3 days can make your congestion worse. When should you call for help? Call your doctor now or seek immediate medical care if:  ? · You have new or worse swelling or redness in your face or around your eyes. ? · You have a new or higher fever. ? Watch closely for changes in your health, and be sure to contact your doctor if:  ? · You have new or worse facial pain. ? · The mucus from your nose becomes thicker (like pus) or has new blood in it. ? · You are not getting better as expected. Where can you learn more? Go to http://saray-leydi.info/. Enter I065 in the search box to learn more about \"Sinusitis: Care Instructions. \"  Current as of: May 12, 2017  Content Version: 11.4  © 5424-2596 [employer]. Care instructions adapted under license by [employer] (which disclaims liability or warranty for this information). If you have questions about a medical condition or this instruction, always ask your healthcare professional. [address] any warranty or liability for your use of this information.

## 2017-12-06 NOTE — PROGRESS NOTES
Chief Complaint   Patient presents with    Migraine    Sore Throat    Generalized Body Aches    Fever    Chills    Ear Fullness    Nasal Congestion    Nausea     1. Have you been to the ER, urgent care clinic since your last visit? Hospitalized since your last visit? No    2. Have you seen or consulted any other health care providers outside of the [address] [place] [address] since your last visit? Include any pap smears or colon screening.  No

## 2017-12-06 NOTE — PROGRESS NOTES
Subjective:     Chief Complaint   Patient presents with    Migraine    Sore Throat    Generalized Body Aches    Fever    Chills    Ear Fullness    Nasal Congestion    Nausea       Claudio Barbosa is a 36 y.o. female who complains of congestion, sore throat, post nasal drip, myalgias, headache, bilateral sinus pain, fever (low grade 100.9), chills and yellow nasal discharge since yesterday, gradually worsening since that time. Has taken motrin for HA and aches and sore throat. No vomiting, had a couple loose stools yesterday and some intermittent nausea on and off since yesterday. Drinking plenty of fluids. Patient does not smoke cigarettes. Denies cardiac complaints including chest pain or discomfort, elevated heart rate, or palpitations. Denies respiratory complaints including SOB, difficulty or pain with breathing, wheezes, and cough. ROS is otherwise negative. No evaluation to date. No recent travel. Sick Contacts?  sick since Friday, was diagnosed with flu    FLU VACCINE? UTD  Chart reviewed: immunizations are up to date and documented. Past Medical History:   Diagnosis Date    HTN (hypertension)     Migraine     Nausea & vomiting     Thyroid disease     hypothyroid     Social History   Substance Use Topics    Smoking status: Former Smoker     Packs/day: 0.50     Years: 4.00    Smokeless tobacco: Never Used      Comment: no vaping    Alcohol use No     Current Outpatient Prescriptions on File Prior to Visit   Medication Sig Dispense Refill    cyclobenzaprine (FLEXERIL) 5 mg tablet Take 1 Tab by mouth three (3) times daily as needed for Muscle Spasm(s). 90 Tab 2    liothyronine (CYTOMEL) 5 mcg tablet TAKE 2 TABLETS DAILY 180 Tab 3    levothyroxine (SYNTHROID) 50 mcg tablet TAKE 1 TABLET DAILY BEFORE BREAKFAST 90 Tab 2    propranolol LA (INDERAL LA) 60 mg SR capsule Take 1 Cap by mouth daily.  30 Cap 10    lansoprazole (PREVACID) 15 mg capsule Take 1 Cap by mouth Daily (before breakfast). 30 Cap 5    CHOLECALCIFEROL, VITAMIN D3, (VITAMIN D3 PO) Take  by mouth daily.  vitamin e 800 unit capsule Take  by mouth daily. No current facility-administered medications on file prior to visit. Allergies   Allergen Reactions    Codeine Nausea Only    Morphine Itching and Nausea and Vomiting    Percocet [Oxycodone-Acetaminophen] Nausea and Vomiting    Sulfa (Sulfonamide Antibiotics) Itching    Vicodin [Hydrocodone-Acetaminophen] Nausea and Vomiting        Review of Systems  Pertinent items are noted in HPI. Agree with nurses note. OBJECTIVE:   Visit Vitals    /84 (BP 1 Location: Left arm, BP Patient Position: Sitting)    Pulse 89    Temp 98.5 °F (36.9 °C) (Oral)    Resp 16    Ht 5' 1.42\" (1.56 m)    Wt 178 lb (80.7 kg)    LMP 12/01/2017    SpO2 98%    BMI 33.17 kg/m2     She appears well, vital signs are as noted above   HEAD:  Normocephalic. Atraumatic. Non tender sinuses x 4. EYE: PERRLA. EOMs intact. Sclera anicteric without injection. No drainage or discharge. EARS: Hearing intact bilaterally. External ear canals normal without evidence of blood or swelling. Bilateral TM's intact, pearly grey with landmarks visible. No erythema or effusion. Clear fluid seen bilateral.  NOSE: Patent. Nasal turbinates boggy. No polyps noted. Mild erythema. No discharge. MOUTH: mucous membranes pink and moist. Posterior pharynx normal with cobblestone appearance. No erythema, white exudate or obstruction. NECK: supple. Midline trachea. RESP: Breath sounds are symmetrical bilaterally. Unlabored without SOB. Speaking in full sentences. Clear to auscultation bilaterally anteriorly and posteriorly. No wheezes. No rales or rhonchi. Non-productive cough when elicited. CV: normal rate. Regular rhythm. S1, S2 audible. No murmur noted. No rubs, clicks or gallops noted. HEME/LYMPH: peripheral pulses palpable 2+ x 4 extremities.   No peripheral edema is noted. No cervical adenopathy noted. SKIN: clean dry and intact throughout. no rashes, erythema, ecchymosis, lacerations, abrasions, suspicious moles noted        Results for orders placed or performed in visit on 12/06/17   AMB POC KEISHA INFLUENZA A/B TEST   Result Value Ref Range    VALID INTERNAL CONTROL POC Yes     Influenza A Ag POC Negative Negative Pos/Neg    Influenza B Ag POC Negative Negative Pos/Neg   AMB POC RAPID STREP A   Result Value Ref Range    VALID INTERNAL CONTROL POC Yes     Group A Strep Ag Negative Negative       Assessment/Plan:   Differential diagnosis and treatment options reviewed with patient who is in agreement with treatment plan as outlined below. ICD-10-CM ICD-9-CM    1. Body aches R52 780.96 AMB POC KEISHA INFLUENZA A/B TEST   2. Sore throat J02.9 462 AMB POC RAPID STREP A   3. Sinus pressure J34.89 478.19    4. Fever, unspecified fever cause R50.9 780.60    5. Nasal drainage J34.89 478.19        Flu and strep negative. Likely viral URI. Symptomatic therapy suggested: push fluids, rest, gargle warm salt water and apply heat to sinuses prn. Lack of antibiotic effectiveness discussed with her. Prescription printed for Augmentin if sinus pain worsens or fever persists. Alternate Ibuprofen with Tylenol every 4 hours as needed for pain and discomfort. OTC nasal saline spray  2-3 sprays per nostril twice a day to clear eustachian tube and assist with post nasal drip symptoms. OTC antihistamines (Zyrtec or Claritin)  to reduce allergic symptoms such as sneezing, runny nose and itchy eyes. OTC Mucinex for congestion relief. Add OTC flonase nasal spray. Verbal and written instructions (see AVS) provided. Patient expresses understanding and agreement of diagnosis and treatment plan.     F/u if symptoms do not improve or worsen

## 2018-01-03 ENCOUNTER — OFFICE VISIT (OUTPATIENT)
Dept: FAMILY MEDICINE CLINIC | Age: 43
End: 2018-01-03

## 2018-01-03 VITALS
RESPIRATION RATE: 14 BRPM | WEIGHT: 179 LBS | HEART RATE: 71 BPM | DIASTOLIC BLOOD PRESSURE: 77 MMHG | HEIGHT: 61 IN | OXYGEN SATURATION: 99 % | TEMPERATURE: 98.3 F | SYSTOLIC BLOOD PRESSURE: 130 MMHG | BODY MASS INDEX: 33.79 KG/M2

## 2018-01-03 DIAGNOSIS — J40 BRONCHITIS: Primary | ICD-10-CM

## 2018-01-03 RX ORDER — BENZONATATE 200 MG/1
200 CAPSULE ORAL
Qty: 21 CAP | Refills: 1 | Status: SHIPPED | OUTPATIENT
Start: 2018-01-03 | End: 2018-01-10

## 2018-01-03 RX ORDER — PROMETHAZINE HYDROCHLORIDE AND CODEINE PHOSPHATE 6.25; 1 MG/5ML; MG/5ML
5 SOLUTION ORAL
Qty: 118 ML | Refills: 1 | Status: SHIPPED | OUTPATIENT
Start: 2018-01-03 | End: 2018-01-25

## 2018-01-03 NOTE — PROGRESS NOTES
DK Santos is a 36 y.o. female who presents with congestion, cough. Started 6 days ago. ok sleep, drinking fluids. Other symptoms include a bad cough that has gotten worse in the last 2 days. Actually did not sleep last night because she was coughing so bad. Denies fever, wheezing. This particular illness started as congestion and a little sore throat for about the last week. He just got substantially worse a few days ago when the cough got worse. Has not really tried anything for symptoms other than over-the-counter Delsym cough syrup    PMHx:  Past Medical History:   Diagnosis Date    HTN (hypertension)     Migraine     Nausea & vomiting     Thyroid disease     hypothyroid       Meds:   Current Outpatient Prescriptions   Medication Sig Dispense Refill    promethazine-codeine (PHENERGAN WITH CODEINE) 6.25-10 mg/5 mL syrup Take 5 mL by mouth four (4) times daily as needed for Cough. Max Daily Amount: 20 mL. 118 mL 1    benzonatate (TESSALON) 200 mg capsule Take 1 Cap by mouth three (3) times daily as needed for Cough for up to 7 days. 21 Cap 1    cyclobenzaprine (FLEXERIL) 5 mg tablet Take 1 Tab by mouth three (3) times daily as needed for Muscle Spasm(s). 90 Tab 2    liothyronine (CYTOMEL) 5 mcg tablet TAKE 2 TABLETS DAILY 180 Tab 3    levothyroxine (SYNTHROID) 50 mcg tablet TAKE 1 TABLET DAILY BEFORE BREAKFAST 90 Tab 2    propranolol LA (INDERAL LA) 60 mg SR capsule Take 1 Cap by mouth daily. 30 Cap 10    lansoprazole (PREVACID) 15 mg capsule Take 1 Cap by mouth Daily (before breakfast). 30 Cap 5    CHOLECALCIFEROL, VITAMIN D3, (VITAMIN D3 PO) Take  by mouth daily.  vitamin e 800 unit capsule Take  by mouth daily. Allergies:    Allergies   Allergen Reactions    Codeine Nausea Only    Morphine Itching and Nausea and Vomiting    Percocet [Oxycodone-Acetaminophen] Nausea and Vomiting    Sulfa (Sulfonamide Antibiotics) Itching    Vicodin [Hydrocodone-Acetaminophen] Nausea and Vomiting       Smoker:  History   Smoking Status    Former Smoker    Packs/day: 0.50    Years: 4.00   Smokeless Tobacco    Never Used     Comment: no vaping       ETOH:   History   Alcohol Use No       FH:   Family History   Problem Relation Age of Onset    Cancer Paternal Grandfather     Stroke Paternal Grandfather     Cancer Maternal Grandmother     Thyroid Disease Maternal Grandmother     Hypertension Maternal Grandfather     Hypertension Father     Hypertension Mother         ROS:  As listed in HPI. In addition:  Constitutional:   No headache, fever, fatigue, weight loss or weight gain      Eyes:   No redness, pruritis, pain, visual changes, swelling, or discharge      Ears:    No pain, loss or changes in hearing     Cardiac:    No chest pain      Resp:   No shortness of breath or wheeze     Neuro   No loss of consciousness, dizziness, seizure    Physical Exam:  Blood pressure 130/77, pulse 71, temperature 98.3 °F (36.8 °C), resp. rate 14, height 5' 1.42\" (1.56 m), weight 179 lb (81.2 kg), SpO2 99 %. GEN: No apparent distress. EYES:  Conjunctiva clear  EAR: TM are clear and without effusion. NOSE: Turbinates are congested  OROPHYARYNX: No erythema or tonsilar exudates  NECK:  Submandibular LAD. Non tender         LUNGS: Respirations unlabored; clear to auscultation bilaterally. No wheeze, rhonchi  CARDIOVASCULAR: Regular, rate, and rhythm  ABDOMEN: Soft; nontender;nl BS  SKIN: No obvious rashes. Assessment and Plan     Bronchitis, relatively mild based on exam but she spent the entire night not sleeping because of cough  Talked about supportive care and lack of  efficacy of antibiotics  Recommended Tessalon for cough  She wanted to try cough medicine she found effective in the past which helped her sleep Phenergancodeine.   Cautioned about addictive nature etc. of this medicine, she feels suitably informed and would like to give this a try    Supportive care is best, provided a handout on available OTC remedies  Nasal steroid OTC for congestion  Honey in warm water for cough    RTC if ssx worsen or fail to improve as expected      ICD-10-CM ICD-9-CM    1. Bronchitis J40 490 promethazine-codeine (PHENERGAN WITH CODEINE) 6.25-10 mg/5 mL syrup      benzonatate (TESSALON) 200 mg capsule       AVS given.  Pt expressed understanding of instructions

## 2018-01-25 ENCOUNTER — OFFICE VISIT (OUTPATIENT)
Dept: FAMILY MEDICINE CLINIC | Age: 43
End: 2018-01-25

## 2018-01-25 VITALS
WEIGHT: 178 LBS | BODY MASS INDEX: 33.61 KG/M2 | HEART RATE: 70 BPM | HEIGHT: 61 IN | TEMPERATURE: 98.2 F | DIASTOLIC BLOOD PRESSURE: 84 MMHG | SYSTOLIC BLOOD PRESSURE: 127 MMHG | RESPIRATION RATE: 16 BRPM | OXYGEN SATURATION: 98 %

## 2018-01-25 DIAGNOSIS — E66.9 OBESITY (BMI 30.0-34.9): ICD-10-CM

## 2018-01-25 DIAGNOSIS — K21.9 GASTROESOPHAGEAL REFLUX DISEASE WITHOUT ESOPHAGITIS: Primary | ICD-10-CM

## 2018-01-25 DIAGNOSIS — E03.9 HYPOTHYROIDISM, UNSPECIFIED TYPE: ICD-10-CM

## 2018-01-25 DIAGNOSIS — I10 ESSENTIAL HYPERTENSION: ICD-10-CM

## 2018-01-25 DIAGNOSIS — Z00.00 ANNUAL PHYSICAL EXAM: ICD-10-CM

## 2018-01-25 DIAGNOSIS — G43.009 MIGRAINE WITHOUT AURA AND WITHOUT STATUS MIGRAINOSUS, NOT INTRACTABLE: ICD-10-CM

## 2018-01-25 RX ORDER — RIZATRIPTAN BENZOATE 10 MG/1
10 TABLET ORAL
COMMUNITY
End: 2020-09-08 | Stop reason: ALTCHOICE

## 2018-01-25 RX ORDER — LANSOPRAZOLE 30 MG/1
30 CAPSULE, DELAYED RELEASE ORAL
Qty: 90 CAP | Refills: 1 | Status: SHIPPED | OUTPATIENT
Start: 2018-01-25 | End: 2018-07-06 | Stop reason: SDUPTHER

## 2018-01-25 RX ORDER — PROPRANOLOL HYDROCHLORIDE 60 MG/1
60 CAPSULE, EXTENDED RELEASE ORAL DAILY
Qty: 90 CAP | Refills: 3 | Status: SHIPPED | OUTPATIENT
Start: 2018-01-25 | End: 2019-01-28 | Stop reason: SDUPTHER

## 2018-01-25 NOTE — PROGRESS NOTES
Chief Complaint   Patient presents with    Physical     1. Have you been to the ER, urgent care clinic since your last visit? Hospitalized since your last visit? No    2. Have you seen or consulted any other health care providers outside of the [address] [place] [address] since your last visit? Include any pap smears or colon screening.  No

## 2018-01-25 NOTE — PROGRESS NOTES
Raenell Mortimer 36 y.o. Raenell Mortimer female presents for annual exam.    No LMP recorded. Patient is postmenopausal.    Screening:   Colonoscopy: done at age 43 for RUQ pain, normal  Pap Smear: scheduled for nexdt month  Mammogram: scheduled @ [facility] next month    Chronic issues:   has a past medical history of HTN (hypertension); Migraine; Nausea & vomiting; and Thyroid disease. Immunizations:   Flu: 10/13/2017  TdaP: done in last 10 years (about 3 years ago). Diet & Exercise:    Diet:   Eats pretty much anything. Doesn't eat breakfast, eats a late lunch and a little dinner. Does try to eat fruits and vegetables and feels that the food she does eat is healthy. Exercise:   No regular exercise. Hypothyroid  Patient with history of hypothyroidism, now on medication. Takes Synthroid and Cytomel. Denies fatigue, weight gain, depressed. Overall feels well. GERD  Patient with history of acid reflux, previously well controlled on Prevacid. Dose was adjusted by PCP but refills sent as previous dose. Requesting 30 mg tablets for optimal GERD control. Migraines  Patient with history of migraines, now well controlled on Propranolol. States she has had maybe about 3 migraines in the last year. Gets a headache with periods each month, but infrequently does this turn into a migraine. Patient does notice that since she started Propranolol she has numbness and tingling in her arms and hands when lifting into certain positions (above her head, for example). Occurs on a daily basis. Allergies   Allergen Reactions    Codeine Nausea Only    Morphine Itching and Nausea and Vomiting    Percocet [Oxycodone-Acetaminophen] Nausea and Vomiting    Sulfa (Sulfonamide Antibiotics) Itching    Vicodin [Hydrocodone-Acetaminophen] Nausea and Vomiting       Current Outpatient Prescriptions:     rizatriptan (MAXALT) 10 mg tablet, Take 10 mg by mouth once as needed for Migraine.  May repeat in 2 hours if needed, Disp: , Rfl:    lansoprazole (PREVACID) 30 mg capsule, Take 1 Cap by mouth Daily (before breakfast). , Disp: 90 Cap, Rfl: 1    propranolol LA (INDERAL LA) 60 mg SR capsule, Take 1 Cap by mouth daily. , Disp: 90 Cap, Rfl: 3    cyclobenzaprine (FLEXERIL) 5 mg tablet, Take 1 Tab by mouth three (3) times daily as needed for Muscle Spasm(s). , Disp: 90 Tab, Rfl: 2    liothyronine (CYTOMEL) 5 mcg tablet, TAKE 2 TABLETS DAILY, Disp: 180 Tab, Rfl: 3    levothyroxine (SYNTHROID) 50 mcg tablet, TAKE 1 TABLET DAILY BEFORE BREAKFAST, Disp: 90 Tab, Rfl: 2    CHOLECALCIFEROL, VITAMIN D3, (VITAMIN D3 PO), Take  by mouth daily. , Disp: , Rfl:     vitamin e 800 unit capsule, Take  by mouth daily. , Disp: , Rfl:       ROS  Gen: denies fever, chills, fatigue, weight loss, weight gain  Eyes: denies blurry vision  Resp: denies dypsnea, cough, wheezing  CV: denies chest pain, palpitations, lower extremity edema  Abd: denies nausea, vomiting, diarrhea, constipation  Neuro: denies numbness/tingling  Endo: denies polyuria, polydipsia, heat/cold intolerance  Heme: no lymphadenopathy, no easy bruising or bleeding    Visit Vitals    /84 (BP 1 Location: Left arm, BP Patient Position: Sitting)    Pulse 70    Temp 98.2 °F (36.8 °C) (Oral)    Resp 16    Ht 5' 1\" (1.549 m)    Wt 178 lb (80.7 kg)    LMP 01/22/2018    SpO2 98%    BMI 33.63 kg/m2     Gen: alert, oriented, no acute distress  Head: NCAT  Eyes: PERRLA, sclera clear, conjunctiva clear  Nose: normal turbinates, no rhinorrhea, no sinus tenderness  Oral: moist mucus membranes, no oral lesions, no pharyngeal inflammation or exudate  Neck: supple, midline trachea, no retractions. Thyroid WNL. Resp: Lungs CTAB, no wheezing, rales or rhonchi  CV: Normal rhythm, normal S1/S2, no m/r/g. No LE edema  Abd: soft, not tender, not distended. No hepatosplenomegaly. Normal bowel sounds.     MSK: normal strength and movement in all extremities, reflexes age appropriate  Skin: no lesion or rash    Diagnoses and all orders for this visit:    1. Gastroesophageal reflux disease without esophagitis  -     lansoprazole (PREVACID) 30 mg capsule; Take 1 Cap by mouth Daily (before breakfast). 2. Hypothyroidism, unspecified type  -     TSH 3RD GENERATION  -     T3 TOTAL  -     T4, FREE    3. Annual physical exam  -     METABOLIC PANEL, COMPREHENSIVE  -     TSH 3RD GENERATION  -     CBC WITH AUTOMATED DIFF  -     T3 TOTAL  -     T4, FREE    4. Essential hypertension  -stable on meds    5. Migraine without aura and without status migrainosus, not intractable  -     propranolol LA (INDERAL LA) 60 mg SR capsule; Take 1 Cap by mouth daily.  -discussed various options for controlling migraines including Verapamil as possibilities to eliminate side effects of propranolol. Patient will think about it and let us know. Advised her that if numbness/tingling gets worse, progresses, we should try different medication. 6. Obesity  Healthy balanced diet low in carbohydrates, saturated fats and red meats and rich in fiber, protein, fruits and vegetables recommended. Recommended 30 minutes cardiovascular exercise such as brisk walking/running at least 3-5x a week. Health Maintenance reviewed - glycohemoglobin ordered, up to date otherwise (tests pending). Verbal and written instructions (see AVS) provided.  Patient expresses understanding of diagnosis and treatment plan.     João Moore MD

## 2018-01-26 LAB
ALBUMIN SERPL-MCNC: 4 G/DL (ref 3.5–5.5)
ALBUMIN/GLOB SERPL: 1.8 {RATIO} (ref 1.2–2.2)
ALP SERPL-CCNC: 111 IU/L (ref 39–117)
ALT SERPL-CCNC: 8 IU/L (ref 0–32)
AST SERPL-CCNC: 8 IU/L (ref 0–40)
BASOPHILS # BLD AUTO: 0 X10E3/UL (ref 0–0.2)
BASOPHILS NFR BLD AUTO: 0 %
BILIRUB SERPL-MCNC: 0.3 MG/DL (ref 0–1.2)
BUN SERPL-MCNC: 9 MG/DL (ref 6–24)
BUN/CREAT SERPL: 16 (ref 9–23)
CALCIUM SERPL-MCNC: 8.7 MG/DL (ref 8.7–10.2)
CHLORIDE SERPL-SCNC: 104 MMOL/L (ref 96–106)
CO2 SERPL-SCNC: 23 MMOL/L (ref 18–29)
CREAT SERPL-MCNC: 0.56 MG/DL (ref 0.57–1)
EOSINOPHIL # BLD AUTO: 0.2 X10E3/UL (ref 0–0.4)
EOSINOPHIL NFR BLD AUTO: 2 %
ERYTHROCYTE [DISTWIDTH] IN BLOOD BY AUTOMATED COUNT: 14.4 % (ref 12.3–15.4)
GFR SERPLBLD CREATININE-BSD FMLA CKD-EPI: 115 ML/MIN/1.73
GFR SERPLBLD CREATININE-BSD FMLA CKD-EPI: 132 ML/MIN/1.73
GLOBULIN SER CALC-MCNC: 2.2 G/DL (ref 1.5–4.5)
GLUCOSE SERPL-MCNC: 112 MG/DL (ref 65–99)
HCT VFR BLD AUTO: 38.9 % (ref 34–46.6)
HGB BLD-MCNC: 13.3 G/DL (ref 11.1–15.9)
IMM GRANULOCYTES # BLD: 0 X10E3/UL (ref 0–0.1)
IMM GRANULOCYTES NFR BLD: 0 %
LYMPHOCYTES # BLD AUTO: 2.4 X10E3/UL (ref 0.7–3.1)
LYMPHOCYTES NFR BLD AUTO: 25 %
MCH RBC QN AUTO: 28 PG (ref 26.6–33)
MCHC RBC AUTO-ENTMCNC: 34.2 G/DL (ref 31.5–35.7)
MCV RBC AUTO: 82 FL (ref 79–97)
MONOCYTES # BLD AUTO: 0.6 X10E3/UL (ref 0.1–0.9)
MONOCYTES NFR BLD AUTO: 6 %
NEUTROPHILS # BLD AUTO: 6.4 X10E3/UL (ref 1.4–7)
NEUTROPHILS NFR BLD AUTO: 67 %
PLATELET # BLD AUTO: 253 X10E3/UL (ref 150–379)
POTASSIUM SERPL-SCNC: 3.6 MMOL/L (ref 3.5–5.2)
PROT SERPL-MCNC: 6.2 G/DL (ref 6–8.5)
RBC # BLD AUTO: 4.75 X10E6/UL (ref 3.77–5.28)
SODIUM SERPL-SCNC: 142 MMOL/L (ref 134–144)
T3 SERPL-MCNC: 122 NG/DL (ref 71–180)
T4 FREE SERPL-MCNC: 0.98 NG/DL (ref 0.82–1.77)
TSH SERPL DL<=0.005 MIU/L-ACNC: 2.57 UIU/ML (ref 0.45–4.5)
WBC # BLD AUTO: 9.6 X10E3/UL (ref 3.4–10.8)

## 2018-02-13 NOTE — PROGRESS NOTES
Ms. Charlene Scott, your labs are normal for the most part except your fasting blood sugar is a little high. Please watch your carbohydrate intake and try to exercise 20 min 3-4x/week. We can recheck this lab in 1 year or sooner if you wish. Your thyroid labs are normal.   This lab should be rechecked in 6 months. Please call the office if you have any questions.

## 2018-02-20 ENCOUNTER — TELEPHONE (OUTPATIENT)
Dept: FAMILY MEDICINE CLINIC | Age: 43
End: 2018-02-20

## 2018-02-21 RX ORDER — LEVOTHYROXINE SODIUM 50 UG/1
TABLET ORAL
Qty: 90 TAB | Refills: 3 | Status: SHIPPED | OUTPATIENT
Start: 2018-02-21 | End: 2019-01-30 | Stop reason: SDUPTHER

## 2018-04-19 ENCOUNTER — OFFICE VISIT (OUTPATIENT)
Dept: FAMILY MEDICINE CLINIC | Age: 43
End: 2018-04-19

## 2018-04-19 VITALS
WEIGHT: 187 LBS | HEIGHT: 61 IN | BODY MASS INDEX: 35.3 KG/M2 | OXYGEN SATURATION: 98 % | DIASTOLIC BLOOD PRESSURE: 78 MMHG | TEMPERATURE: 98.4 F | HEART RATE: 66 BPM | RESPIRATION RATE: 12 BRPM | SYSTOLIC BLOOD PRESSURE: 119 MMHG

## 2018-04-19 DIAGNOSIS — I10 ESSENTIAL HYPERTENSION: ICD-10-CM

## 2018-04-19 DIAGNOSIS — R10.84 GENERALIZED ABDOMINAL PAIN: Primary | ICD-10-CM

## 2018-04-19 DIAGNOSIS — E07.9 THYROID DISEASE: ICD-10-CM

## 2018-04-19 RX ORDER — ONDANSETRON 4 MG/1
4 TABLET, FILM COATED ORAL
Qty: 30 TAB | Refills: 0 | Status: SHIPPED | OUTPATIENT
Start: 2018-04-19 | End: 2020-09-08 | Stop reason: ALTCHOICE

## 2018-04-19 NOTE — PROGRESS NOTES
Chief Complaint   Patient presents with    Elevated Blood Pressure     Patient states her average BP reading is 160/94 or higher.

## 2018-04-19 NOTE — PROGRESS NOTES
Subjective:     Nasrin Rose is a 36 y.o. female who presents today with the following:  Chief Complaint   Patient presents with    Elevated Blood Pressure     Abdominal pain and bloating  Patient presents today with about 1 week of nausea and abdominal pain. This started about a week ago after eating some tuna that patient thought may have been bad. She had intense bloating and \"sour stomach\". This improved throughout the weekend. Along with these symptoms she has had recurrent nausea and headache with ear tingling. She checked her BP at one point at it was about 160/90s. She has continued to check and systolic BP has ranged about 296J-583E and diastolic 35D-713P. She is eating less and still experiencing waves of nausea. No diarrhea, no vomiting. Slight abdominal pain. Has continued taking Prevacid. Increased dose of Propranolol to 120 mg since BP was running high and she has felt better from that standpoint.      ROS:  Gen: denies fever, chills, fatigue, weight loss, weight gain  HEENT:denies blurry vision, nasal congestion, sore throat  Resp: denies dypsnea, cough, wheezing  CV: denies chest pain, palpitations, lower extremity edema  Abd: denies nausea, vomiting, diarrhea, constipation  Neuro: denies numbness/tingling  Endo: denies polyuria, polydipsia, heat/cold intolerance  Heme: no lymphadenopathy    Allergies   Allergen Reactions    Codeine Nausea Only    Morphine Itching and Nausea and Vomiting    Percocet [Oxycodone-Acetaminophen] Nausea and Vomiting    Sulfa (Sulfonamide Antibiotics) Itching    Vicodin [Hydrocodone-Acetaminophen] Nausea and Vomiting         Current Outpatient Prescriptions:     ondansetron hcl (ZOFRAN) 4 mg tablet, Take 1 Tab by mouth every eight (8) hours as needed for Nausea., Disp: 30 Tab, Rfl: 0    levothyroxine (SYNTHROID) 50 mcg tablet, TAKE 1 TABLET DAILY BEFORE BREAKFAST, Disp: 90 Tab, Rfl: 3    rizatriptan (MAXALT) 10 mg tablet, Take 10 mg by mouth once as needed for Migraine. May repeat in 2 hours if needed, Disp: , Rfl:     lansoprazole (PREVACID) 30 mg capsule, Take 1 Cap by mouth Daily (before breakfast). , Disp: 90 Cap, Rfl: 1    propranolol LA (INDERAL LA) 60 mg SR capsule, Take 1 Cap by mouth daily. , Disp: 90 Cap, Rfl: 3    liothyronine (CYTOMEL) 5 mcg tablet, TAKE 2 TABLETS DAILY, Disp: 180 Tab, Rfl: 3    CHOLECALCIFEROL, VITAMIN D3, (VITAMIN D3 PO), Take  by mouth daily. , Disp: , Rfl:     vitamin e 800 unit capsule, Take  by mouth daily. , Disp: , Rfl:     Past Medical History:   Diagnosis Date    HTN (hypertension)     Migraine     Nausea & vomiting     Thyroid disease     hypothyroid       Past Surgical History:   Procedure Laterality Date    HX CHOLECYSTECTOMY      HX DILATION AND CURETTAGE      HX GI         History   Smoking Status    Former Smoker    Packs/day: 0.50    Years: 4.00   Smokeless Tobacco    Never Used     Comment: no vaping       Social History     Social History    Marital status:      Spouse name: N/A    Number of children: N/A    Years of education: N/A     Social History Main Topics    Smoking status: Former Smoker     Packs/day: 0.50     Years: 4.00    Smokeless tobacco: Never Used      Comment: no vaping    Alcohol use No    Drug use: No    Sexual activity: Not Asked     Other Topics Concern    None     Social History Narrative       Family History   Problem Relation Age of Onset    Cancer Paternal Grandfather     Stroke Paternal Grandfather     Cancer Maternal Grandmother     Thyroid Disease Maternal Grandmother     Hypertension Maternal Grandfather     Hypertension Father    Antonia He Glaucoma Father     Hypertension Mother          Objective:     Visit Vitals    /78 (BP 1 Location: Left arm, BP Patient Position: Sitting)    Pulse 66    Temp 98.4 °F (36.9 °C) (Oral)    Resp 12    Ht 5' 1\" (1.549 m)    Wt 187 lb (84.8 kg)    LMP 04/09/2018    SpO2 98%    BMI 35.33 kg/m2     Gen: alert, oriented, no acute distress  Head: normocephalic, atraumatic  Eyes:sclera clear, conjunctiva clear  Oral: moist mucus membranes, no oral lesions, no pharyngeal exudate, no pharyngeal erythema  Neck:, no carotid bruits  Resp: Normal work of breathing, lungs CTAB, no w/r/r  CV: S1, S2 normal.  No murmurs, rubs, or gallops. Abd:  Normal bowel sounds. Soft, not tender, not distended. No results found for this visit on 04/19/18. Assessment/ Plan:   Diagnoses and all orders for this visit:    1. Generalized abdominal pain  -     METABOLIC PANEL, COMPREHENSIVE  -     CBC WITH AUTOMATED DIFF  -     ondansetron hcl (ZOFRAN) 4 mg tablet; Take 1 Tab by mouth every eight (8) hours as needed for Nausea. Zofran for nausea, check labs, give it a few more days. May have had food poisening and/or GI bug and stomach is still reacting to this. 2. Essential hypertension  -BP at goal today  -check BP once a day (not multiple times)  -ok to continue with Inderal 120 mg. Will reassess at follow up and cut bak to 60 mg if needed. Verbal and written instructions (see AVS) provided.  Patient expresses understanding of diagnosis and treatment plan. [facility].  Zaki Hanna MD

## 2018-04-20 LAB
ALBUMIN SERPL-MCNC: 3.8 G/DL (ref 3.5–5.5)
ALBUMIN/GLOB SERPL: 1.4 {RATIO} (ref 1.2–2.2)
ALP SERPL-CCNC: 129 IU/L (ref 39–117)
ALT SERPL-CCNC: 16 IU/L (ref 0–32)
AST SERPL-CCNC: 13 IU/L (ref 0–40)
BASOPHILS # BLD AUTO: 0 X10E3/UL (ref 0–0.2)
BASOPHILS NFR BLD AUTO: 0 %
BILIRUB SERPL-MCNC: 0.3 MG/DL (ref 0–1.2)
BUN SERPL-MCNC: 9 MG/DL (ref 6–24)
BUN/CREAT SERPL: 14 (ref 9–23)
CALCIUM SERPL-MCNC: 8.7 MG/DL (ref 8.7–10.2)
CHLORIDE SERPL-SCNC: 102 MMOL/L (ref 96–106)
CO2 SERPL-SCNC: 23 MMOL/L (ref 18–29)
CREAT SERPL-MCNC: 0.64 MG/DL (ref 0.57–1)
EOSINOPHIL # BLD AUTO: 0.2 X10E3/UL (ref 0–0.4)
EOSINOPHIL NFR BLD AUTO: 2 %
ERYTHROCYTE [DISTWIDTH] IN BLOOD BY AUTOMATED COUNT: 14.2 % (ref 12.3–15.4)
GFR SERPLBLD CREATININE-BSD FMLA CKD-EPI: 110 ML/MIN/1.73
GFR SERPLBLD CREATININE-BSD FMLA CKD-EPI: 126 ML/MIN/1.73
GLOBULIN SER CALC-MCNC: 2.7 G/DL (ref 1.5–4.5)
GLUCOSE SERPL-MCNC: 131 MG/DL (ref 65–99)
HCT VFR BLD AUTO: 38.7 % (ref 34–46.6)
HGB BLD-MCNC: 12.9 G/DL (ref 11.1–15.9)
IMM GRANULOCYTES # BLD: 0 X10E3/UL (ref 0–0.1)
IMM GRANULOCYTES NFR BLD: 0 %
LYMPHOCYTES # BLD AUTO: 2.2 X10E3/UL (ref 0.7–3.1)
LYMPHOCYTES NFR BLD AUTO: 23 %
MCH RBC QN AUTO: 27.6 PG (ref 26.6–33)
MCHC RBC AUTO-ENTMCNC: 33.3 G/DL (ref 31.5–35.7)
MCV RBC AUTO: 83 FL (ref 79–97)
MONOCYTES # BLD AUTO: 0.5 X10E3/UL (ref 0.1–0.9)
MONOCYTES NFR BLD AUTO: 5 %
NEUTROPHILS # BLD AUTO: 6.8 X10E3/UL (ref 1.4–7)
NEUTROPHILS NFR BLD AUTO: 70 %
PLATELET # BLD AUTO: 288 X10E3/UL (ref 150–379)
POTASSIUM SERPL-SCNC: 3.9 MMOL/L (ref 3.5–5.2)
PROT SERPL-MCNC: 6.5 G/DL (ref 6–8.5)
RBC # BLD AUTO: 4.67 X10E6/UL (ref 3.77–5.28)
SODIUM SERPL-SCNC: 141 MMOL/L (ref 134–144)
WBC # BLD AUTO: 9.8 X10E3/UL (ref 3.4–10.8)

## 2018-05-11 NOTE — PROGRESS NOTES
Ms. Nola Lawton, your labs show your blood sugar is a little high. Please schedule a return office visit so we can check an A1c on you to make sure you are not pre-diabetic or diabetic.

## 2018-07-06 ENCOUNTER — OFFICE VISIT (OUTPATIENT)
Dept: FAMILY MEDICINE CLINIC | Age: 43
End: 2018-07-06

## 2018-07-06 VITALS
DIASTOLIC BLOOD PRESSURE: 60 MMHG | RESPIRATION RATE: 16 BRPM | HEIGHT: 61 IN | HEART RATE: 64 BPM | SYSTOLIC BLOOD PRESSURE: 108 MMHG | WEIGHT: 182 LBS | OXYGEN SATURATION: 96 % | BODY MASS INDEX: 34.36 KG/M2 | TEMPERATURE: 97.8 F

## 2018-07-06 DIAGNOSIS — R35.0 URINARY FREQUENCY: Primary | ICD-10-CM

## 2018-07-06 DIAGNOSIS — N39.0 URINARY TRACT INFECTION WITHOUT HEMATURIA, SITE UNSPECIFIED: ICD-10-CM

## 2018-07-06 DIAGNOSIS — R73.09 ELEVATED GLUCOSE: ICD-10-CM

## 2018-07-06 DIAGNOSIS — R30.9 URINARY PAIN: ICD-10-CM

## 2018-07-06 DIAGNOSIS — K21.9 GASTROESOPHAGEAL REFLUX DISEASE WITHOUT ESOPHAGITIS: ICD-10-CM

## 2018-07-06 LAB
BILIRUB UR QL STRIP: NORMAL
GLUCOSE UR-MCNC: NEGATIVE MG/DL
HBA1C MFR BLD HPLC: 5 %
KETONES P FAST UR STRIP-MCNC: NEGATIVE MG/DL
PH UR STRIP: 5.5 [PH] (ref 4.6–8)
PROT UR QL STRIP: NORMAL
SP GR UR STRIP: 1.03 (ref 1–1.03)
UA UROBILINOGEN AMB POC: NORMAL (ref 0.2–1)
URINALYSIS CLARITY POC: NORMAL
URINALYSIS COLOR POC: YELLOW
URINE BLOOD POC: NEGATIVE
URINE LEUKOCYTES POC: NORMAL
URINE NITRITES POC: NEGATIVE

## 2018-07-06 RX ORDER — LANSOPRAZOLE 30 MG/1
30 CAPSULE, DELAYED RELEASE ORAL
Qty: 90 CAP | Refills: 3 | Status: SHIPPED | OUTPATIENT
Start: 2018-07-06 | End: 2019-01-28 | Stop reason: SDUPTHER

## 2018-07-06 RX ORDER — CIPROFLOXACIN 250 MG/1
250 TABLET, FILM COATED ORAL EVERY 12 HOURS
Qty: 10 TAB | Refills: 0 | Status: SHIPPED | OUTPATIENT
Start: 2018-07-06 | End: 2018-07-11

## 2018-07-06 RX ORDER — LANSOPRAZOLE 30 MG/1
30 CAPSULE, DELAYED RELEASE ORAL
Qty: 90 CAP | Refills: 3 | Status: SHIPPED | OUTPATIENT
Start: 2018-07-06 | End: 2018-07-06 | Stop reason: SDUPTHER

## 2018-07-06 RX ORDER — LIOTHYRONINE SODIUM 5 UG/1
TABLET ORAL
Qty: 180 TAB | Refills: 3 | Status: SHIPPED | OUTPATIENT
Start: 2018-07-06 | End: 2018-07-06 | Stop reason: SDUPTHER

## 2018-07-06 RX ORDER — LIOTHYRONINE SODIUM 5 UG/1
TABLET ORAL
Qty: 180 TAB | Refills: 3 | Status: SHIPPED | OUTPATIENT
Start: 2018-07-06 | End: 2019-07-29 | Stop reason: SDUPTHER

## 2018-07-06 NOTE — PATIENT INSTRUCTIONS
Urinary Tract Infection in Female Teens: Care Instructions  Your Care Instructions    A urinary tract infection, or UTI, is a general term for an infection anywhere between the kidneys and the urethra (where urine comes out). Most UTIs are bladder infections. They often cause pain or burning when you urinate. UTIs are caused by bacteria and can be cured with antibiotics. Be sure to complete your treatment so that the infection does not get worse. Follow-up care is a key part of your treatment and safety. Be sure to make and go to all appointments, and call your doctor if you are having problems. It's also a good idea to know your test results and keep a list of the medicines you take. How can you care for yourself at home? · Take your antibiotics as directed. Do not stop taking them just because you feel better. You need to take the full course of antibiotics. · Drink extra water and other fluids for the next day or two. This will help make the urine less concentrated and help wash out the bacteria that are causing the infection. (If you have kidney, heart, or liver disease and have to limit fluids, talk with your doctor before you increase the amount of fluids you drink.)  · Avoid drinks that are carbonated or have caffeine. They can irritate the bladder. · Urinate often. Try to empty your bladder each time. · To relieve pain, take a hot bath or lay a heating pad set on low over your lower belly or genital area. Never go to sleep with a heating pad in place. To prevent UTIs  · Drink plenty of water each day. This helps you urinate often, which clears bacteria from your system. (If you have kidney, heart, or liver disease and have to limit fluids, talk with your doctor before you increase the amount of fluids you drink.)  · Urinate when you need to. · If you are sexually active, urinate right after you have sex. · Change sanitary pads often.   · Avoid douches, bubble baths, feminine hygiene sprays, and other feminine hygiene products that have deodorants. · After going to the bathroom, wipe from front to back. When should you call for help? Call your doctor now or seek immediate medical care if:  ? · Symptoms such as fever, chills, nausea, or vomiting get worse or appear for the first time. ? · You have new pain in your back just below your rib cage. This is called flank pain. ? · There is new blood or pus in your urine. ? · You have any problems with your antibiotic medicine. ? Watch closely for changes in your health, and be sure to contact your doctor if:  ? · You are not getting better after taking an antibiotic for 2 days. ? · Your symptoms go away but then come back. Where can you learn more? Go to http://saray-leydi.info/. Enter V064 in the search box to learn more about \"Urinary Tract Infection in Female Teens: Care Instructions. \"  Current as of: May 12, 2017  Content Version: 11.4  © 0259-0812 [employer]. Care instructions adapted under license by [employer] (which disclaims liability or warranty for this information). If you have questions about a medical condition or this instruction, always ask your healthcare professional. [place] [address] any warranty or liability for your use of this information.

## 2018-07-06 NOTE — PROGRESS NOTES
Chief Complaint   Patient presents with    Bladder Infection     1. Have you been to the ER, urgent care clinic since your last visit? Hospitalized since your last visit? No    2. Have you seen or consulted any other health care providers outside of the [facility] since your last visit? Include any pap smears or colon screening.  No

## 2018-07-06 NOTE — MR AVS SNAPSHOT
[facility] 
 
 
 [address] 
[phone] Patient: Mary Dyer MRN:  [id number] Visit Information Date & Time Provider Department Dept. Phone Encounter #  
 2018  3:00 PM [facility]malley[facility] [address] [phone] [id number] Upcoming Health Maintenance Date Due DTaP/Tdap/Td series (1 - Tdap) 10/21/1995 PAP AKA CERVICAL CYTOLOGY 10/21/1995 Influenza Age 5 to Adult 2018 Allergies as of 2018  Review Complete On: 2018 By: Raymond Omalley NP Severity Noted Reaction Type Reactions Codeine  2011    Nausea Only Morphine  2014    Itching, Nausea and Vomiting Percocet [Oxycodone-acetaminophen]  2011    Nausea and Vomiting  
 Sulfa (Sulfonamide Antibiotics)  2011    Itching Vicodin [Hydrocodone-acetaminophen]  2014    Nausea and Vomiting Current Immunizations  Never Reviewed No immunizations on file. Not reviewed this visit You Were Diagnosed With   
  
 Codes Comments Urinary frequency    -  Primary ICD-10-CM: R35.0 ICD-9-CM: 788.41 Urinary pain     ICD-10-CM: R30.9 ICD-9-CM: 788.1 Urinary tract infection without hematuria, site unspecified     ICD-10-CM: N39.0 ICD-9-CM: 599.0 Elevated glucose     ICD-10-CM: R73.09 
ICD-9-CM: 790.29 Vitals BP Pulse Temp Resp Height(growth percentile) Weight(growth percentile) 108/60 (BP 1 Location: Left arm, BP Patient Position: Sitting) 64 97.8 °F (36.6 °C) (Oral) 16 5' 1\" (1.549 m) 182 lb (82.6 kg) SpO2 BMI OB Status Smoking Status 96% 34.39 kg/m2 Having regular periods Former Smoker Vitals History BMI and BSA Data Body Mass Index Body Surface Area  
 34.39 kg/m 2 1.89 m 2 Preferred Pharmacy Pharmacy Name Phone [facility] [phone] Your Updated Medication List  
  
   
This list is accurate as of 7/6/18  3:30 PM.  Always use your most recent med list.  
  
  
  
  
 ciprofloxacin HCl 250 mg tablet Commonly known as:  CIPRO Take 1 Tab by mouth every twelve (12) hours for 5 days. lansoprazole 30 mg capsule Commonly known as:  PREVACID Take 1 Cap by mouth Daily (before breakfast). levothyroxine 50 mcg tablet Commonly known as:  SYNTHROID  
TAKE 1 TABLET DAILY BEFORE BREAKFAST  
  
 liothyronine 5 mcg tablet Commonly known as:  CYTOMEL  
TAKE 2 TABLETS DAILY MAXALT 10 mg tablet Generic drug:  rizatriptan Take 10 mg by mouth once as needed for Migraine. May repeat in 2 hours if needed  
  
 ondansetron hcl 4 mg tablet Commonly known as:  Donnel Blank Take 1 Tab by mouth every eight (8) hours as needed for Nausea. propranolol LA 60 mg SR capsule Commonly known as:  INDERAL LA Take 1 Cap by mouth daily. VITAMIN D3 PO Take  by mouth daily. vitamin e 800 unit capsule Take  by mouth daily. Prescriptions Sent to Pharmacy Refills  
 ciprofloxacin HCl (CIPRO) 250 mg tablet 0 Sig: Take 1 Tab by mouth every twelve (12) hours for 5 days. Class: Normal  
 Pharmacy: [address] #: [phone] Route: Oral  
  
We Performed the Following AMB POC HEMOGLOBIN A1C [56711 CPT(R)] AMB POC URINALYSIS DIP STICK AUTO W/O MICRO [73237 CPT(R)] CULTURE, URINE [id number] CPT(R)] Patient Instructions Urinary Tract Infection in Female Teens: Care Instructions Your Care Instructions A urinary tract infection, or UTI, is a general term for an infection anywhere between the kidneys and the urethra (where urine comes out). Most UTIs are bladder infections. They often cause pain or burning when you urinate. UTIs are caused by bacteria and can be cured with antibiotics.  Be sure to complete your treatment so that the infection does not get worse. Follow-up care is a key part of your treatment and safety. Be sure to make and go to all appointments, and call your doctor if you are having problems. It's also a good idea to know your test results and keep a list of the medicines you take. How can you care for yourself at home? · Take your antibiotics as directed. Do not stop taking them just because you feel better. You need to take the full course of antibiotics. · Drink extra water and other fluids for the next day or two. This will help make the urine less concentrated and help wash out the bacteria that are causing the infection. (If you have kidney, heart, or liver disease and have to limit fluids, talk with your doctor before you increase the amount of fluids you drink.) · Avoid drinks that are carbonated or have caffeine. They can irritate the bladder. · Urinate often. Try to empty your bladder each time. · To relieve pain, take a hot bath or lay a heating pad set on low over your lower belly or genital area. Never go to sleep with a heating pad in place. To prevent UTIs · Drink plenty of water each day. This helps you urinate often, which clears bacteria from your system. (If you have kidney, heart, or liver disease and have to limit fluids, talk with your doctor before you increase the amount of fluids you drink.) · Urinate when you need to. · If you are sexually active, urinate right after you have sex. · Change sanitary pads often. · Avoid douches, bubble baths, feminine hygiene sprays, and other feminine hygiene products that have deodorants. · After going to the bathroom, wipe from front to back. When should you call for help? Call your doctor now or seek immediate medical care if: 
? · Symptoms such as fever, chills, nausea, or vomiting get worse or appear for the first time. ? · You have new pain in your back just below your rib cage. This is called flank pain. ? · There is new blood or pus in your urine. ? · You have any problems with your antibiotic medicine. ? Watch closely for changes in your health, and be sure to contact your doctor if: 
? · You are not getting better after taking an antibiotic for 2 days. ? · Your symptoms go away but then come back. Where can you learn more? Go to http://saray-leydi.info/. Enter T256 in the search box to learn more about \"Urinary Tract Infection in Female Teens: Care Instructions. \" Current as of: May 12, 2017 Content Version: 11.4 © 4594-8453 [employer]. Care instructions adapted under license by [employer] (which disclaims liability or warranty for this information). If you have questions about a medical condition or this instruction, always ask your healthcare professional. [address] any warranty or liability for your use of this information. Introducing [facility]! Dear Kamila Sanabria: 
Thank you for requesting a AOL account. Our records indicate that you already have an active AOL account. You can access your account anytime at [email or web address] Did you know that you can access your hospital and ER discharge instructions at any time in AOL? You can also review all of your test results from your hospital stay or ER visit. Additional Information If you have questions, please visit the Frequently Asked Questions section of the AOL website at [email or web address] Remember, AOL is NOT to be used for urgent needs. For medical emergencies, dial 911. Now available from your iPhone and Android! Please provide this summary of care documentation to your next provider. Your primary care clinician is listed as Verena Lott. If you have any questions after today's visit, please call [phone].

## 2018-07-06 NOTE — PROGRESS NOTES
Subjective:      Chief Complaint   Patient presents with    Bladder Infection       Caroline Burdick is a 36 y.o. female that presents today with a chief complaint of dysuria. The patient admits to accompanying urgency, frequency and suprapubic pain. The patient denies nausea, vomiting, and fever. Has history of UTI in the past.    Had urinary symptoms around June 8 and treated with OTC cystex and cranberry and symptoms resolved. Then symptoms came back around 6/23/18 and says at that she started macrobid that she had from previous UTI (took 2 doses daily for 6 days), symptoms resolved. Then symptoms returned couple days ago, started Cystex and Cranberry pills but this morning the pain was worse than it has been. Past Medical History:   Diagnosis Date    HTN (hypertension)     Migraine     Nausea & vomiting     Thyroid disease     hypothyroid     Current Outpatient Prescriptions   Medication Sig    ondansetron hcl (ZOFRAN) 4 mg tablet Take 1 Tab by mouth every eight (8) hours as needed for Nausea.  levothyroxine (SYNTHROID) 50 mcg tablet TAKE 1 TABLET DAILY BEFORE BREAKFAST    rizatriptan (MAXALT) 10 mg tablet Take 10 mg by mouth once as needed for Migraine. May repeat in 2 hours if needed    lansoprazole (PREVACID) 30 mg capsule Take 1 Cap by mouth Daily (before breakfast).  propranolol LA (INDERAL LA) 60 mg SR capsule Take 1 Cap by mouth daily.  liothyronine (CYTOMEL) 5 mcg tablet TAKE 2 TABLETS DAILY    CHOLECALCIFEROL, VITAMIN D3, (VITAMIN D3 PO) Take  by mouth daily.  vitamin e 800 unit capsule Take  by mouth daily. No current facility-administered medications for this visit.       Allergies   Allergen Reactions    Codeine Nausea Only    Morphine Itching and Nausea and Vomiting    Percocet [Oxycodone-Acetaminophen] Nausea and Vomiting    Sulfa (Sulfonamide Antibiotics) Itching    Vicodin [Hydrocodone-Acetaminophen] Nausea and Vomiting     Social History   Substance Use Topics    Smoking status: Former Smoker     Packs/day: 0.50     Years: 4.00    Smokeless tobacco: Never Used      Comment: no vaping    Alcohol use No       ROS per HPI. Objective:     Visit Vitals    /60 (BP 1 Location: Left arm, BP Patient Position: Sitting)    Pulse 64    Temp 97.8 °F (36.6 °C) (Oral)    Resp 16    Ht 5' 1\" (1.549 m)    Wt 182 lb (82.6 kg)    SpO2 96%    BMI 34.39 kg/m2       Skin: no pallor, normal turgor  HEENT:  Normocephalic, no conjunctival inflammation, pupils equal round and reactive to light, MMM, no oral lesions  Heart: regular rate and rhythm, no murmurs, rubs or gallops  Lungs: no increased respiratory effort, clear to ausculation bilaterally  Abdomen: soft, mild suprapubic tenderness, not distended. Normal bowel sounds. No rebound or guarding. No bruits. Back: no costovertebral angle tenderness  Extremities:no edema or cyanosis  Neuro awake, alert and oriented    Results for orders placed or performed in visit on 07/06/18   AMB POC URINALYSIS DIP STICK AUTO W/O MICRO   Result Value Ref Range    Color (UA POC) Yellow     Clarity (UA POC) Slightly Cloudy     Glucose (UA POC) Negative Negative    Bilirubin (UA POC) 1+ Negative    Ketones (UA POC) Negative Negative    Specific gravity (UA POC) 1.030 1.001 - 1.035    Blood (UA POC) Negative Negative    pH (UA POC) 5.5 4.6 - 8.0    Protein (UA POC) 1+ Negative    Urobilinogen (UA POC) 0.2 mg/dL 0.2 - 1    Nitrites (UA POC) Negative Negative    Leukocyte esterase (UA POC) 1+ Negative   AMB POC HEMOGLOBIN A1C   Result Value Ref Range    Hemoglobin A1c (POC) 5.0 %         Assessment/Plan:   Differential diagnosis and treatment options reviewed with patient who is in agreement with treatment plan as outlined below. ICD-10-CM ICD-9-CM    1. Urinary frequency R35.0 788.41 AMB POC URINALYSIS DIP STICK AUTO W/O MICRO      CULTURE, URINE   2. Urinary pain R30.9 788.1 AMB POC URINALYSIS DIP STICK AUTO W/O MICRO   3. Urinary tract infection without hematuria, site unspecified N39.0 599.0 CULTURE, URINE      ciprofloxacin HCl (CIPRO) 250 mg tablet   4. Elevated glucose R73.09 790.29 AMB POC HEMOGLOBIN A1C   5. Gastroesophageal reflux disease without esophagitis K21.9 530.81 lansoprazole (PREVACID) 30 mg capsule      DISCONTINUED: lansoprazole (PREVACID) 30 mg capsule     Will start on antibiotic based on past history of UTI    General instruction:  1. Drink plenty of fluids. 2. You can use Uristat or Azostandard to help with symptoms. These medications can turn your urine an orange color. Symptoms should resolve within 24-48 hours after starting the antibiotic. 3. Follow-up if symptoms not improving in 2-3 days. 4. If you develop fever, abdomenal pain, back pain, or nausea and vomiting then return to clinic or go to the emergency room. This could indicate that you have a more serious infection or and infection in the kidneys. Refilled medications per her request.   HgbA1c done per Dr Jamal Mills last note (had elevated glucose on last labs). No DM per normal HgbA1c. Follow up if no improvement or worsening of symptoms. Consider referral to urology in future if recurrent UTI. Patient declines at this time. Verbal and written instructions (see AVS) provided. Patient expresses understanding of diagnosis and treatment plan.

## 2018-07-09 LAB
BACTERIA UR CULT: ABNORMAL
BACTERIA UR CULT: ABNORMAL

## 2018-07-12 NOTE — PROGRESS NOTES
Sent to [facility]  Urine culture is back and antibiotic that you were on should have killed the bacteria. Let me know if you have any questions  Hope your feeling better.   [facility]

## 2018-10-22 ENCOUNTER — DOCUMENTATION ONLY (OUTPATIENT)
Dept: PRIMARY CARE CLINIC | Age: 43
End: 2018-10-22

## 2018-10-22 DIAGNOSIS — N30.01 ACUTE CYSTITIS WITH HEMATURIA: Primary | ICD-10-CM

## 2018-10-22 DIAGNOSIS — R30.0 DYSURIA: ICD-10-CM

## 2018-10-22 LAB
BILIRUB UR QL STRIP: NEGATIVE
GLUCOSE UR-MCNC: NEGATIVE MG/DL
KETONES P FAST UR STRIP-MCNC: NEGATIVE MG/DL
PH UR STRIP: 7 [PH] (ref 4.6–8)
PROT UR QL STRIP: NEGATIVE
SP GR UR STRIP: 1.01 (ref 1–1.03)
UA UROBILINOGEN AMB POC: NORMAL (ref 0.2–1)
URINALYSIS CLARITY POC: CLEAR
URINALYSIS COLOR POC: YELLOW
URINE BLOOD POC: NORMAL
URINE LEUKOCYTES POC: NORMAL
URINE NITRITES POC: NEGATIVE

## 2018-10-22 RX ORDER — NITROFURANTOIN 25; 75 MG/1; MG/1
100 CAPSULE ORAL 2 TIMES DAILY
Qty: 10 CAP | Refills: 0 | Status: SHIPPED | OUTPATIENT
Start: 2018-10-22 | End: 2018-10-22 | Stop reason: CLARIF

## 2018-10-22 RX ORDER — NITROFURANTOIN 25; 75 MG/1; MG/1
100 CAPSULE ORAL 2 TIMES DAILY
Qty: 10 CAP | Refills: 0 | Status: SHIPPED | OUTPATIENT
Start: 2018-10-22 | End: 2018-10-27

## 2018-10-24 LAB — BACTERIA UR CULT: ABNORMAL

## 2018-10-24 NOTE — PROGRESS NOTES
HISTORY OF PRESENT ILLNESS  Kwabena Copeland is a 39 y.o. female. She presents today with urinary frequency x 2 days. She denies back pain, fever, weakness. She has been drinking fluids. She denies any vaginal discharge.       Physical Exam   Alert, nontoxic looking female  Heart:RRR, no M,G,R  Lungs: clear  Abd soft, Nondistended, BS +, no CVA tenderness  Urine positive for Leukocytes, nitrates and blood  Urine sent to lab for culture  ASSESSMENT and PLAN  UTI  Macrobid 100 mg BID x 5 days  Push PO fluids  AZO as needed OTC for urinary discomfort

## 2019-01-28 ENCOUNTER — OFFICE VISIT (OUTPATIENT)
Dept: FAMILY MEDICINE CLINIC | Age: 44
End: 2019-01-28

## 2019-01-28 VITALS
HEART RATE: 88 BPM | SYSTOLIC BLOOD PRESSURE: 129 MMHG | RESPIRATION RATE: 18 BRPM | BODY MASS INDEX: 31.91 KG/M2 | TEMPERATURE: 98.1 F | OXYGEN SATURATION: 98 % | HEIGHT: 61 IN | WEIGHT: 169 LBS | DIASTOLIC BLOOD PRESSURE: 83 MMHG

## 2019-01-28 DIAGNOSIS — M54.50 LUMBAR PAIN: ICD-10-CM

## 2019-01-28 DIAGNOSIS — R82.90 FOUL SMELLING URINE: ICD-10-CM

## 2019-01-28 DIAGNOSIS — Z01.89 ENCOUNTER FOR ROUTINE LABORATORY TESTING: ICD-10-CM

## 2019-01-28 DIAGNOSIS — E55.9 VITAMIN D INSUFFICIENCY: ICD-10-CM

## 2019-01-28 DIAGNOSIS — M54.2 ARTHRALGIA OF CERVICAL SPINE: ICD-10-CM

## 2019-01-28 DIAGNOSIS — Z00.00 WELLNESS EXAMINATION: Primary | ICD-10-CM

## 2019-01-28 DIAGNOSIS — R82.90 ABNORMAL URINALYSIS: ICD-10-CM

## 2019-01-28 DIAGNOSIS — K21.9 GASTROESOPHAGEAL REFLUX DISEASE WITHOUT ESOPHAGITIS: ICD-10-CM

## 2019-01-28 DIAGNOSIS — Z13.220 SCREENING, LIPID: ICD-10-CM

## 2019-01-28 DIAGNOSIS — I10 ESSENTIAL HYPERTENSION: ICD-10-CM

## 2019-01-28 DIAGNOSIS — E03.9 HYPOTHYROIDISM, UNSPECIFIED TYPE: ICD-10-CM

## 2019-01-28 DIAGNOSIS — G43.009 MIGRAINE WITHOUT AURA AND WITHOUT STATUS MIGRAINOSUS, NOT INTRACTABLE: ICD-10-CM

## 2019-01-28 DIAGNOSIS — K64.9 HEMORRHOIDS, UNSPECIFIED HEMORRHOID TYPE: ICD-10-CM

## 2019-01-28 LAB
BILIRUB UR QL STRIP: NEGATIVE
GLUCOSE UR-MCNC: NEGATIVE MG/DL
KETONES P FAST UR STRIP-MCNC: NEGATIVE MG/DL
PH UR STRIP: 7 [PH] (ref 4.6–8)
PROT UR QL STRIP: NEGATIVE
SP GR UR STRIP: 1.02 (ref 1–1.03)
UA UROBILINOGEN AMB POC: NORMAL (ref 0.2–1)
URINALYSIS CLARITY POC: NORMAL
URINALYSIS COLOR POC: NORMAL
URINE BLOOD POC: NORMAL
URINE LEUKOCYTES POC: NORMAL
URINE NITRITES POC: NEGATIVE

## 2019-01-28 RX ORDER — PROPRANOLOL HYDROCHLORIDE 60 MG/1
60 CAPSULE, EXTENDED RELEASE ORAL DAILY
Qty: 90 CAP | Refills: 3 | Status: SHIPPED | OUTPATIENT
Start: 2019-01-28 | End: 2020-03-09 | Stop reason: SDUPTHER

## 2019-01-28 RX ORDER — LANSOPRAZOLE 30 MG/1
30 CAPSULE, DELAYED RELEASE ORAL
Qty: 90 CAP | Refills: 3 | Status: SHIPPED | OUTPATIENT
Start: 2019-01-28 | End: 2019-10-17 | Stop reason: SDUPTHER

## 2019-01-28 NOTE — PROGRESS NOTES
Chief Complaint Patient presents with  Physical  
 
Health Maintenance reviewed 1. Have you been to the ER, urgent care clinic since your last visit? Hospitalized since your last visit? No     
 
2. Have you seen or consulted any other health care providers outside of the [address] [place] [address] since your last visit? Include any pap smears or colon screening.  No

## 2019-01-28 NOTE — PATIENT INSTRUCTIONS
Hemorrhoids: Care Instructions Your Care Instructions Hemorrhoids are enlarged veins that develop in the anal canal. Bleeding during bowel movements, itching, swelling, and rectal pain are the most common symptoms. They can be uncomfortable at times, but hemorrhoids rarely are a serious problem. You can treat most hemorrhoids with simple changes to your diet and bowel habits. These changes include eating more fiber and not straining to pass stools. Most hemorrhoids do not need surgery or other treatment unless they are very large and painful or bleed a lot. Follow-up care is a key part of your treatment and safety. Be sure to make and go to all appointments, and call your doctor if you are having problems. It's also a good idea to know your test results and keep a list of the medicines you take. How can you care for yourself at home? · Sit in a few inches of warm water (sitz bath) 3 times a day and after bowel movements. The warm water helps with pain and itching. · Put ice on your anal area several times a day for 10 minutes at a time. Put a thin cloth between the ice and your skin. Follow this by placing a warm, wet towel on the area for another 10 to 20 minutes. · Take pain medicines exactly as directed. ? If the doctor gave you a prescription medicine for pain, take it as prescribed. ? If you are not taking a prescription pain medicine, ask your doctor if you can take an over-the-counter medicine. · Keep the anal area clean, but be gentle. Use water and a fragrance-free soap, such as [place], or use baby wipes or medicated pads, such as Tucks. · Wear cotton underwear and loose clothing to decrease moisture in the anal area. · Eat more fiber. Include foods such as whole-grain breads and cereals, raw vegetables, raw and dried fruits, and beans. · Drink plenty of fluids, enough so that your urine is light yellow or clear like water.  If you have kidney, heart, or liver disease and have to limit fluids, talk with your doctor before you increase the amount of fluids you drink. · Use a stool softener that contains bran or psyllium. You can save money by buying bran or psyllium (available in bulk at most health food stores) and sprinkling it on foods or stirring it into fruit juice. Or you can use a product such as Metamucil or Hydrocil. · Practice healthy bowel habits. ? Go to the bathroom as soon as you have the urge. ? Avoid straining to pass stools. Relax and give yourself time to let things happen naturally. ? Do not hold your breath while passing stools. ? Do not read while sitting on the toilet. Get off the toilet as soon as you have finished. · Take your medicines exactly as prescribed. Call your doctor if you think you are having a problem with your medicine. When should you call for help? Call 911 anytime you think you may need emergency care. For example, call if: 
  · You pass maroon or very bloody stools.  
 Call your doctor now or seek immediate medical care if: 
  · You have increased pain.  
  · You have increased bleeding.  
 Watch closely for changes in your health, and be sure to contact your doctor if: 
  · Your symptoms have not improved after 3 or 4 days. Where can you learn more? Go to http://saray-leydi.info/. Enter F228 in the search box to learn more about \"Hemorrhoids: Care Instructions. \" Current as of: March 27, 2018 Content Version: 11.9 © 7740-6137 [employer]. Care instructions adapted under license by [employer] (which disclaims liability or warranty for this information). If you have questions about a medical condition or this instruction, always ask your healthcare professional. [address] [place] [address] any warranty or liability for your use of this information. Hypothyroidism: Care Instructions Your Care Instructions You have hypothyroidism, which means that your body is not making enough thyroid hormone. This hormone helps your body use energy. If your thyroid level is low, you may feel tired, be constipated, have an increase in your blood pressure, or have dry skin or memory problems. You may also get cold easily, even when it is warm. Women with low thyroid levels may have heavy menstrual periods. A blood test to find your thyroid-stimulating hormone (TSH) level is used to check for hypothyroidism. A high TSH level may mean that you have low thyroid. When your body is not making enough thyroid hormone, TSH levels rise in an effort to make the body produce more. The treatment for hypothyroidism is to take thyroid hormone pills. You should start to feel better in 1 to 2 weeks. But it can take several months to see changes in the TSH level. You will need regular visits with your doctor to make sure you have the right dose of medicine. Most people need treatment for the rest of their lives. You will need to see your doctor regularly to have blood tests and to make sure you are doing well. Follow-up care is a key part of your treatment and safety. Be sure to make and go to all appointments, and call your doctor if you are having problems. It's also a good idea to know your test results and keep a list of the medicines you take. How can you care for yourself at home? · Take your thyroid hormone medicine exactly as prescribed. Call your doctor if you think you are having a problem with your medicine. Most people do not have side effects if they take the right amount of medicine regularly. ? Take the medicine 30 minutes before breakfast, and do not take it with calcium, vitamins, or iron. ? Do not take extra doses of your thyroid medicine. It will not help you get better any faster, and it may cause side effects. ? If you forget to take a dose, do NOT take a double dose of medicine. Take your usual dose the next day.  
· Tell your doctor about all prescription, herbal, or over-the-counter products you take. · Take care of yourself. Eat a healthy diet, get enough sleep, and get regular exercise. When should you call for help? Call 911 anytime you think you may need emergency care. For example, call if: 
  · You passed out (lost consciousness).  
  · You have severe trouble breathing.  
  · You have a very slow heartbeat (less than 60 beats a minute).  
  · You have a low body temperature (95°F or below).  
 Call your doctor now or seek immediate medical care if: 
  · You feel tired, sluggish, or weak.  
  · You have trouble remembering things or concentrating.  
  · You do not begin to feel better 2 weeks after starting your medicine.  
 Watch closely for changes in your health, and be sure to contact your doctor if you have any problems. Where can you learn more? Go to http://saray-leydi.info/. Enter I289 in the search box to learn more about \"Hypothyroidism: Care Instructions. \" Current as of: March 14, 2018 Content Version: 11.9 © 3713-0756 [employer]. Care instructions adapted under license by [employer] (which disclaims liability or warranty for this information). If you have questions about a medical condition or this instruction, always ask your healthcare professional. [address] any warranty or liability for your use of this information.

## 2019-01-28 NOTE — PROGRESS NOTES
Chief Complaint Patient presents with  Physical  
 
 
 
S: Laila Bates is a 39 y.o. female who presents for wellness exam. 
 
 
Last pap-  Done at [facility] in [place], scheduled next month Last Mammogram- UTD. She does complain of some intermittent urinary complaints, says that her urine has a very foul odor and sometimes she has brief dysuria but the dysuria usually clears itself up within a day. She says that she has noticed the urine smell for the past 2-3 months. No fever, no bladder spasms. Denies any obvious changes in her diet, says that she has cut back on her sugar intake. She says she is pushing water intake. Patient's last menstrual period was 01/25/2019. Sneha Him Birth control- none,  vasectomy Children- has 2 children ages 16 and 25 Denies excessive cramping, bloating, moodiness or breast tenderness. Denies other GYN symptoms including discharge, itching, dyspareunia, or recent changes in cycle. Denies any systemic symptoms including fever, myalgias, chills, weakness, weight loss and fatigue. Denies respiratory symptoms including cough, SOB, or wheezing. Denies any cardiac symptoms including chest pain, tightness or discomfort or syncope. ROS is otherwise negative. Nutrition: trying to watch diet. Admits that she could do better. Physical excercise: active but no formal exercise at this time. Tobacco: Denies smoking or use of other tobacco products Alcohol: Denies alcohol use Has had increase in anxiety and some depressive symptoms that she treats with essential oils and praying. Has had a lot of increase stressors over the past 6 months. She says that her symptoms are not debilitating and does not want to be on any prescriptions. She was on [facility] in the past but ran out, she felt that this helped a lot. Denies any SI/HI. Hypothyroidism Taking 50mcg Levothyroxine in AM and 10mcg of cytomel QAM on empty stomach. On this regimen for several years and has worked well. No hypothyroid symptoms, says she is always tired but not anything new. Complains of cervical pain for the past 6 months or so, says that she can feel \"grinding and pops and it feels so deep like in my spinal area\" and has tried massage therapy, muscle relaxants, stretches and NSAID (occasionally). She also noted lower back pain and \"knots\" all along the lumbar sacral  Area. Says she tries to stretch a lot but does not see to make better. No radiation down legs now (but says she had some radiation down her legs few months ago but that went away\". Says she feels constant ache and stiffness. Denies any injury. Says that a week ago she had GI bug and had diarrhea. Since the diarrhea, she says she has a hemorrhoid, internal with some bulging. She says she has had to have surgery in the past.  She has been using the proctocort and tucks without relief. She saw Deshawn Dawna in the past.  
No active bleeding. Migraines- controlled on propranolol, maxalt PRN for acute migraine. Past Medical History:  
Diagnosis Date  
 HTN (hypertension)  Migraine  Nausea & vomiting  Thyroid disease   
 hypothyroid Past Surgical History:  
Procedure Laterality Date  HX CHOLECYSTECTOMY  HX DILATION AND CURETTAGE    
 HX GI Allergies Allergen Reactions  Codeine Nausea Only  Morphine Itching and Nausea and Vomiting  Percocet [Oxycodone-Acetaminophen] Nausea and Vomiting  Sulfa (Sulfonamide Antibiotics) Itching  Vicodin [Hydrocodone-Acetaminophen] Nausea and Vomiting Current Outpatient Medications Medication Sig Dispense Refill  liothyronine (CYTOMEL) 5 mcg tablet TAKE 2 TABLETS DAILY 180 Tab 3  
 lansoprazole (PREVACID) 30 mg capsule Take 1 Cap by mouth Daily (before breakfast).  90 Cap 3  
 ondansetron hcl (ZOFRAN) 4 mg tablet Take 1 Tab by mouth every eight (8) hours as needed for Nausea. 30 Tab 0  
 levothyroxine (SYNTHROID) 50 mcg tablet TAKE 1 TABLET DAILY BEFORE BREAKFAST 90 Tab 3  
 rizatriptan (MAXALT) 10 mg tablet Take 10 mg by mouth once as needed for Migraine. May repeat in 2 hours if needed  propranolol LA (INDERAL LA) 60 mg SR capsule Take 1 Cap by mouth daily. 90 Cap 3  
 vitamin e 800 unit capsule Take  by mouth daily.  CHOLECALCIFEROL, VITAMIN D3, (VITAMIN D3 PO) Take  by mouth daily. Agree with nurses note. O: VS:  
Visit Vitals /83 (BP 1 Location: Left arm, BP Patient Position: Sitting) Pulse 88 Temp 98.1 °F (36.7 °C) (Oral) Resp 18 Ht 5' 1\" (1.549 m) Wt 169 lb (76.7 kg) SpO2 98% BMI 31.93 kg/m² PAIN: No complaints of pain today. GENERAL: Katy Copeland is sitting on the table in no acute distress. Non-toxic. Well nourished. Well developed. Appropriately groomed. She is friendly, social and cooperative. HEAD:  Normocephalic. Atraumatic. Non tender sinuses x 4. EYE: PERRLA. EOMs intact. Sclera anicteric without injection. No drainage or discharge. EARS: Hearing intact bilaterally. External ear canals normal without evidence of blood or swelling. Bilateral TM's intact, pearly grey with landmarks visible. No erythema or effusion. NOSE: Patent. Nasal turbinates pink. No polyps noted. No erythema. No discharge. MOUTH: mucous membranes pink and moist. Posterior pharynx normal with cobblestone appearance. No erythema, white exudate or obstruction. NECK: supple. Midline trachea. No thyromegaly noted. No cervical deformity or pain on palpation. Has full ROM of neck. RESP: Breath sounds are symmetrical bilaterally. Unlabored without SOB. Speaking in full sentences. Clear to auscultation bilaterally anteriorly and posteriorly. No wheezes. No rales or rhonchi. CV: normal rate. Regular rhythm. S1, S2 audible. No murmur noted. No rubs, clicks or gallops noted. ABDOMEN: Flat without bulges or pulsations. Soft and nondistended. No tenderness on palpation. No masses or organomegaly. No rebound, rigidity or guarding. Bowel sounds normal x 4 quadrants. BACK: No visible deformities or curvature. Full ROM. No pain on palpation of the spinous processes in the cervical, thoracic, lumbar, sacral regions. No CVA tenderness. There is some tenderness in the paraspinal muscle area of lumbar/sacral area. NEURO:  awake, alert and oriented to person, place, and time and event. Clear speech. Muscle strength is +5/5 x 4 extremities. Steady gait. MUSC:  Intact x 4 extremities. Full ROM x 4 extremities. No pain with movement. HEME/LYMPH: peripheral pulses palpable 2+ x 4 extremities. No peripheral edema is noted. No cervical adenopathy noted. SKIN: clean dry and intact throughout. No rashes, erythema, ecchymosis, lacerations, abrasions, suspicious moles noted. PSYCH: appropriate behavior, dress and thought processes. Good eye contact. Clear and coherent speech. Full affect. Good insight. Results for orders placed or performed in visit on 01/28/19 AMB POC URINALYSIS DIP STICK AUTO W/O MICRO Result Value Ref Range Color (UA POC) Dark Yellow Clarity (UA POC) Cloudy Glucose (UA POC) Negative Negative Bilirubin (UA POC) Negative Negative Ketones (UA POC) Negative Negative Specific gravity (UA POC) 1.020 1.001 - 1.035 Blood (UA POC) 3+ Negative pH (UA POC) 7.0 4.6 - 8.0 Protein (UA POC) Negative Negative Urobilinogen (UA POC) 1 mg/dL 0.2 - 1 Nitrites (UA POC) Negative Negative Leukocyte esterase (UA POC) 1+ Negative Assessment: Wellness examination Plan: 
Differential diagnosis and treatment options reviewed with patient who is in agreement with treatment plan as outlined below. ICD-10-CM ICD-9-CM 1. Wellness examination Z00.00 V70.0 2. Vitamin D insufficiency E55.9 268.9 VITAMIN D, 25 HYDROXY 3. Essential hypertension I10 401.9 4. Hypothyroidism, unspecified type E03.9 244.9 TSH 3RD GENERATION  
   T3 TOTAL T4, FREE  
   METABOLIC PANEL, COMPREHENSIVE 5. Screening, lipid Z13.220 V77.91 LIPID PANEL 6. Encounter for routine laboratory testing Z00.00 V72.60 LIPID PANEL  
   METABOLIC PANEL, COMPREHENSIVE  
   CBC WITH AUTOMATED DIFF 7. Foul smelling urine R82.90 791.9 AMB POC URINALYSIS DIP STICK AUTO W/O MICRO  
   CULTURE, URINE  
8. Abnormal urinalysis R82.90 791.9 CULTURE, URINE 9. Arthralgia of cervical spine M54.2 723.1 XR SPINE CERV 4 OR 5 V  
10. Lumbar pain M54.5 724.2 XR SPINE LUMB 2 OR 3 V  
11. Hemorrhoids, unspecified hemorrhoid type K64.9 455.6 REFERRAL TO COLON AND RECTAL SURGERY 12. Migraine without aura and without status migrainosus, not intractable G43.009 346.10 propranolol LA (INDERAL LA) 60 mg SR capsule 13. Gastroesophageal reflux disease without esophagitis K21.9 530.81 lansoprazole (PREVACID) 30 mg capsule C-spine and L-spine ordered. If normal, will refer to PT. She thinks she will go to [facility]. She will try taking naprosyn BID with food for 10-14 days and continue with daily stretches. Labs today. Will MyChart or Call with results. UC sent. Advised to increase water intake. Discussed BMI and healthy weight. Encouraged patient to work to implement changes including diet high in raw fruits and vegetables, lean protein and good fats. Limit refined, processed carbohydrates and sugar. Encouraged regular exercise. Refills sent. Will wait on thyroid labs before refilling thyroid medicine. BP at goal.  Continue current therapy. Routine refills sent. Referral to colorectal surgery for hemorrhoid evaluation and treatment. Discussed high fiber diet. Continue sitz soaks. Discussed health maintenance screening guidelines Advised on nutrition, physical activity, weight management, tobacco, alcohol and safety HEALTH MAINTENANCE GOALS & RECOMMENDATIONS (Included in AVS): 
· Attain and/or maintain a healthy weight (BMI between 18 and 30) · Attain and/or maintain regular physical activity for 30 minutes 5 days/week · Eat at least 5 servings of fruits and vegetables daily, preferably fresh · Limit fast food and prepared foods · Attain and/or maintain healthy blood pressure · Attain and/or maintain no nicotine/tobacco use status · Annual flu shot (or nasal mist as appropriate) · Stay up-to-date with immunizations including tetanus, pertussis, HPV, & pneumonia · Annual eye exam  
· Biannual dental visits · Annual skin cancer screening · Annual gynecologic visit for women over age 24 
· Annual prostate exam for black men starting at age 36, and for other races starting at age 48 (unless indicated earlier by history) · Colonscopy every 10 years after age 48 (unless indicated more frequently by history) · Consider daily 81mg aspirin for men over age 39 and women over age 54 · Annual screening labs: thyroid tests (TSH and T4), complete blood count, lipid panel (cholesterol), hemoglobin A1c (diabetes screening), comprehensive metabolic panel (includes kidney function, liver function, and electrolytes), vitamin D level, urinalysis (with urine culture and microalbumin as medically indicated) · Consider annual testing for sexually transmitted infections including HIV · Screening bone density testing in all women over age 72 Verbal and written instructions (see AVS) provided. Patient expresses understanding and agreement of diagnosis and treatment plan.

## 2019-01-29 LAB
25(OH)D3+25(OH)D2 SERPL-MCNC: 27.9 NG/ML (ref 30–100)
ALBUMIN SERPL-MCNC: 4.6 G/DL (ref 3.5–5.5)
ALBUMIN/GLOB SERPL: 1.8 {RATIO} (ref 1.2–2.2)
ALP SERPL-CCNC: 108 IU/L (ref 39–117)
ALT SERPL-CCNC: 9 IU/L (ref 0–32)
AST SERPL-CCNC: 14 IU/L (ref 0–40)
BASOPHILS # BLD AUTO: 0 X10E3/UL (ref 0–0.2)
BASOPHILS NFR BLD AUTO: 0 %
BILIRUB SERPL-MCNC: 0.4 MG/DL (ref 0–1.2)
BUN SERPL-MCNC: 7 MG/DL (ref 6–24)
BUN/CREAT SERPL: 11 (ref 9–23)
CALCIUM SERPL-MCNC: 9.1 MG/DL (ref 8.7–10.2)
CHLORIDE SERPL-SCNC: 101 MMOL/L (ref 96–106)
CHOLEST SERPL-MCNC: 168 MG/DL (ref 100–199)
CO2 SERPL-SCNC: 25 MMOL/L (ref 20–29)
CREAT SERPL-MCNC: 0.61 MG/DL (ref 0.57–1)
EOSINOPHIL # BLD AUTO: 0.1 X10E3/UL (ref 0–0.4)
EOSINOPHIL NFR BLD AUTO: 1 %
ERYTHROCYTE [DISTWIDTH] IN BLOOD BY AUTOMATED COUNT: 14.1 % (ref 12.3–15.4)
GLOBULIN SER CALC-MCNC: 2.6 G/DL (ref 1.5–4.5)
GLUCOSE SERPL-MCNC: 86 MG/DL (ref 65–99)
HCT VFR BLD AUTO: 41.5 % (ref 34–46.6)
HDLC SERPL-MCNC: 44 MG/DL
HGB BLD-MCNC: 14.3 G/DL (ref 11.1–15.9)
IMM GRANULOCYTES # BLD AUTO: 0 X10E3/UL (ref 0–0.1)
IMM GRANULOCYTES NFR BLD AUTO: 0 %
INTERPRETATION, 910389: NORMAL
LDLC SERPL CALC-MCNC: 102 MG/DL (ref 0–99)
LYMPHOCYTES # BLD AUTO: 1.7 X10E3/UL (ref 0.7–3.1)
LYMPHOCYTES NFR BLD AUTO: 19 %
MCH RBC QN AUTO: 27.6 PG (ref 26.6–33)
MCHC RBC AUTO-ENTMCNC: 34.5 G/DL (ref 31.5–35.7)
MCV RBC AUTO: 80 FL (ref 79–97)
MONOCYTES # BLD AUTO: 0.5 X10E3/UL (ref 0.1–0.9)
MONOCYTES NFR BLD AUTO: 6 %
NEUTROPHILS # BLD AUTO: 6.4 X10E3/UL (ref 1.4–7)
NEUTROPHILS NFR BLD AUTO: 74 %
PLATELET # BLD AUTO: 300 X10E3/UL (ref 150–379)
POTASSIUM SERPL-SCNC: 3.9 MMOL/L (ref 3.5–5.2)
PROT SERPL-MCNC: 7.2 G/DL (ref 6–8.5)
RBC # BLD AUTO: 5.18 X10E6/UL (ref 3.77–5.28)
SODIUM SERPL-SCNC: 140 MMOL/L (ref 134–144)
T3 SERPL-MCNC: 115 NG/DL (ref 71–180)
T4 FREE SERPL-MCNC: 1.14 NG/DL (ref 0.82–1.77)
TRIGL SERPL-MCNC: 111 MG/DL (ref 0–149)
TSH SERPL DL<=0.005 MIU/L-ACNC: 1.11 UIU/ML (ref 0.45–4.5)
VLDLC SERPL CALC-MCNC: 22 MG/DL (ref 5–40)
WBC # BLD AUTO: 8.7 X10E3/UL (ref 3.4–10.8)

## 2019-01-30 DIAGNOSIS — N39.0 URINARY TRACT INFECTION WITHOUT HEMATURIA, SITE UNSPECIFIED: Primary | ICD-10-CM

## 2019-01-30 LAB — BACTERIA UR CULT: ABNORMAL

## 2019-01-30 RX ORDER — LEVOTHYROXINE SODIUM 50 UG/1
TABLET ORAL
Qty: 90 TAB | Refills: 3 | Status: SHIPPED | OUTPATIENT
Start: 2019-01-30 | End: 2019-08-26 | Stop reason: SDUPTHER

## 2019-01-30 RX ORDER — NITROFURANTOIN 25; 75 MG/1; MG/1
100 CAPSULE ORAL 2 TIMES DAILY
Qty: 10 CAP | Refills: 0 | Status: SHIPPED | OUTPATIENT
Start: 2019-01-30 | End: 2019-02-04

## 2019-01-31 NOTE — PROGRESS NOTES
I called patient to review labs with her. Advised that thyroid levels were normal.  I sent her levothyroxine refill into her mail order Her vitamin D level was a little low, advised her to take over-the-counter supplement of vitamin D3 2000 units/day. Urine culture was positive for UTI. I discussed these results with her, will send Macrobid to [facility]. She will return to clinic for repeat urine culture should her symptoms persist after she has completed these antibiotics.

## 2019-02-05 ENCOUNTER — HOSPITAL ENCOUNTER (OUTPATIENT)
Dept: GENERAL RADIOLOGY | Age: 44
Discharge: HOME | End: 2019-02-05
Payer: COMMERCIAL

## 2019-02-05 DIAGNOSIS — R52 PAIN: ICD-10-CM

## 2019-02-05 PROCEDURE — 72050 X-RAY EXAM NECK SPINE 4/5VWS: CPT

## 2019-02-05 PROCEDURE — 72052 X-RAY EXAM NECK SPINE 6/>VWS: CPT

## 2019-02-05 PROCEDURE — 72100 X-RAY EXAM L-S SPINE 2/3 VWS: CPT

## 2019-02-07 RX ORDER — CIPROFLOXACIN 500 MG/1
500 TABLET ORAL DAILY
Qty: 7 TAB | Refills: 0 | Status: SHIPPED | OUTPATIENT
Start: 2019-02-07 | End: 2019-02-14

## 2019-02-07 NOTE — PROGRESS NOTES
Sent to [facility]:  Hi Ms Ginger Jaime,  The Xray of your lumbar spine is normal.  Let me know if you have any questions  Cliffton Primrose Polo FNP

## 2019-02-15 ENCOUNTER — DOCUMENTATION ONLY (OUTPATIENT)
Dept: FAMILY MEDICINE CLINIC | Age: 44
End: 2019-02-15

## 2019-08-27 RX ORDER — LEVOTHYROXINE SODIUM 50 UG/1
TABLET ORAL
Qty: 90 TAB | Refills: 3 | Status: SHIPPED | OUTPATIENT
Start: 2019-08-27 | End: 2020-03-09 | Stop reason: SDUPTHER

## 2019-08-27 NOTE — TELEPHONE ENCOUNTER
Requested Prescriptions     Pending Prescriptions Disp Refills    levothyroxine (SYNTHROID) 50 mcg tablet 90 Tab 3     Sig: TAKE 1 TABLET DAILY BEFORE BREAKFAST     Future Appointments:  No future appointments.    Last Appointment With Me:  01.28.2019

## 2019-10-17 DIAGNOSIS — K21.9 GASTROESOPHAGEAL REFLUX DISEASE WITHOUT ESOPHAGITIS: ICD-10-CM

## 2019-10-17 RX ORDER — LANSOPRAZOLE 30 MG/1
30 CAPSULE, DELAYED RELEASE ORAL
Qty: 90 CAP | Refills: 3 | Status: SHIPPED | OUTPATIENT
Start: 2019-10-17 | End: 2020-03-09 | Stop reason: SDUPTHER

## 2019-10-17 NOTE — TELEPHONE ENCOUNTER
Requested Prescriptions     Pending Prescriptions Disp Refills    lansoprazole (PREVACID) 30 mg capsule 90 Cap 3     Sig: Take 1 Cap by mouth Daily (before breakfast). Requested by [facility].

## 2019-12-02 RX ORDER — LEVOTHYROXINE SODIUM 50 UG/1
TABLET ORAL
Qty: 90 TAB | Refills: 3 | OUTPATIENT
Start: 2019-12-02

## 2019-12-02 NOTE — TELEPHONE ENCOUNTER
Left message for pt to return my call. Called pharmacy, they voiced that pt has been receiving synthroid, but pt has refills on medication. Pharmacy voiced that if she wants generic only then she will need to call and let them know the  make of the medication.

## 2019-12-02 NOTE — TELEPHONE ENCOUNTER
Pt is calling wanting to know if her thyroid med could be changed to the generic med it was done in brand can this be done for pt

## 2020-01-14 RX ORDER — LIOTHYRONINE SODIUM 5 UG/1
TABLET ORAL
Qty: 180 TAB | Refills: 0 | Status: SHIPPED | OUTPATIENT
Start: 2020-01-14 | End: 2020-03-09 | Stop reason: SDUPTHER

## 2020-03-05 ENCOUNTER — OFFICE VISIT (OUTPATIENT)
Dept: FAMILY MEDICINE CLINIC | Age: 45
End: 2020-03-05

## 2020-03-05 VITALS
OXYGEN SATURATION: 98 % | WEIGHT: 171.2 LBS | HEART RATE: 57 BPM | TEMPERATURE: 98.2 F | DIASTOLIC BLOOD PRESSURE: 71 MMHG | SYSTOLIC BLOOD PRESSURE: 110 MMHG | BODY MASS INDEX: 32.32 KG/M2 | HEIGHT: 61 IN | RESPIRATION RATE: 16 BRPM

## 2020-03-05 DIAGNOSIS — R45.86 MOOD SWINGS: ICD-10-CM

## 2020-03-05 DIAGNOSIS — Z01.89 ENCOUNTER FOR ROUTINE LABORATORY TESTING: ICD-10-CM

## 2020-03-05 DIAGNOSIS — Z13.220 SCREENING, LIPID: ICD-10-CM

## 2020-03-05 DIAGNOSIS — E03.9 HYPOTHYROIDISM, UNSPECIFIED TYPE: ICD-10-CM

## 2020-03-05 DIAGNOSIS — M54.50 LUMBAR PAIN: ICD-10-CM

## 2020-03-05 DIAGNOSIS — Z00.00 WELLNESS EXAMINATION: Primary | ICD-10-CM

## 2020-03-05 DIAGNOSIS — E55.9 VITAMIN D INSUFFICIENCY: ICD-10-CM

## 2020-03-05 DIAGNOSIS — I10 ESSENTIAL HYPERTENSION: ICD-10-CM

## 2020-03-05 NOTE — PROGRESS NOTES
Chief Complaint   Patient presents with    Well Woman     no pap    Medication Refill         S: Jordan Bermeo is a 38 y.o. female  who presents for wellness exam.    Last pap- done at GYN, scheduled next week  Last Mammogram- scheduled next week  Colonoscopy-  High risk due to family history, due this year, she will call and schedule   Routine Labs reviewed- due for labs today      Patient's last menstrual period was 01/16/2020 (exact date). Denies excessive cramping, bloating, moodiness or breast tenderness. Denies other GYN symptoms including discharge, itching, dyspareunia. Irregular cycles for the past 3-5 years, has only been having menstrual cycles about every 6-8 weeks. Nutrition: she admits that she is not following a diet. Tries to eat variety. Physical excercise:  No formal exercise but tries to stretch daily. Social:  Work has been stressful. Son just became  and daughter went to college so she has new worries. Tobacco: Denies smoking or use of other tobacco products  Alcohol: rare alcohol use    Says she has lower left side back pain. Says she did PT last year and that helped but didn't last.  Feels like her lower back is \"out of alignment, when I sit I can see one leg is longer than the other\"  Neck is doing much better. Hypothyroid:  Taking same dose as she has been. She has been sleeping ok  No new  hair loss. No palpitations. No weight gain or temperature intolerance. She feels that her depression has worsened about two weeks ago. Says she felt \"heavienss in my eyes, super not interested in doing anything. I did not want to cook, I did not want to talk to people\". She says she thinks her hormones are contributing. Says she stopped taking [address] few months ago but started taking again on Sunday. Says she is feeling better today. Does not want to take medication if she does not have to.    Denies SI/HI    Denies any systemic symptoms including fever, myalgias, chills, weakness, weight loss and fatigue. Denies respiratory symptoms including cough, SOB, or wheezing. Denies any cardiac symptoms including chest pain, tightness or discomfort or syncope. ROS is otherwise negative      Past Medical History:   Diagnosis Date    HTN (hypertension)     Migraine     Nausea & vomiting     Thyroid disease     hypothyroid     Past Surgical History:   Procedure Laterality Date    HX CHOLECYSTECTOMY      HX DILATION AND CURETTAGE      HX GI       Allergies   Allergen Reactions    Codeine Nausea Only    Morphine Itching and Nausea and Vomiting    Percocet [Oxycodone-Acetaminophen] Nausea and Vomiting    Sulfa (Sulfonamide Antibiotics) Itching    Vicodin [Hydrocodone-Acetaminophen] Nausea and Vomiting     Current Outpatient Medications   Medication Sig Dispense Refill    liothyronine (CYTOMEL) 5 mcg tablet TAKE 2 TABLETS DAILY 180 Tab 0    lansoprazole (PREVACID) 30 mg capsule Take 1 Cap by mouth Daily (before breakfast). 90 Cap 3    levothyroxine (SYNTHROID) 50 mcg tablet TAKE 1 TABLET DAILY BEFORE BREAKFAST 90 Tab 3    propranolol LA (INDERAL LA) 60 mg SR capsule Take 1 Cap by mouth daily. 90 Cap 3    ondansetron hcl (ZOFRAN) 4 mg tablet Take 1 Tab by mouth every eight (8) hours as needed for Nausea. 30 Tab 0    rizatriptan (MAXALT) 10 mg tablet Take 10 mg by mouth once as needed for Migraine. May repeat in 2 hours if needed      CHOLECALCIFEROL, VITAMIN D3, (VITAMIN D3 PO) Take  by mouth daily.  vitamin e 800 unit capsule Take  by mouth daily. Agree with nurses note. O: VS:   Visit Vitals  /71 (BP 1 Location: Left arm, BP Patient Position: Sitting)   Pulse (!) 57   Temp 98.2 °F (36.8 °C) (Oral)   Resp 16   Ht 5' 1\" (1.549 m)   Wt 171 lb 3.2 oz (77.7 kg)   SpO2 98%   BMI 32.35 kg/m²     PAIN: No complaints of pain today. GENERAL: Savanna Brandt is sitting on the table in no acute distress. Non-toxic. Well nourished.  Well developed. Appropriately groomed. She is friendly, social and cooperative. HEAD:  Normocephalic. Atraumatic. Non tender sinuses x 4. EYE: PERRLA. EOMs intact. Sclera anicteric without injection. No drainage or discharge. EARS: Hearing intact bilaterally. External ear canals normal without evidence of blood or swelling. Bilateral TM's intact, pearly grey with landmarks visible. No erythema or effusion. NOSE: Patent. Nasal turbinates pink. No polyps noted. No erythema. No discharge. MOUTH: mucous membranes pink and moist. Posterior pharynx normal with cobblestone appearance. No erythema, white exudate or obstruction. NECK: supple. Midline trachea. No thyromegaly noted. RESP: Breath sounds are symmetrical bilaterally. Unlabored without SOB. Speaking in full sentences. Clear to auscultation bilaterally anteriorly and posteriorly. No wheezes. No rales or rhonchi. CV: normal rate. Regular rhythm. S1, S2 audible. No murmur noted. No rubs, clicks or gallops noted. ABDOMEN: Flat without bulges or pulsations. Soft and nondistended. No tenderness on palpation. No masses or organomegaly. No rebound, rigidity or guarding. Bowel sounds normal x 4 quadrants. BACK: No visible deformities or curvature. Full ROM. No pain on palpation of the spinous processes in the cervical, thoracic, lumbar, sacral regions. No CVA tenderness. NEURO:  awake, alert and oriented to person, place, and time and event. Clear speech. Muscle strength is +5/5 x 4 extremities. Steady gait. MUSC:  Intact x 4 extremities. Full ROM x 4 extremities. No pain with movement. HEME/LYMPH: peripheral pulses palpable 2+ x 4 extremities. No peripheral edema is noted. No cervical adenopathy noted. SKIN: clean dry and intact throughout. No rashes, erythema, ecchymosis, lacerations, abrasions, suspicious moles noted. PSYCH: appropriate behavior, dress and thought processes. Good eye contact. Clear and coherent speech. Full affect. Good insight. Plan:  Differential diagnosis and treatment options reviewed with patient who is in agreement with treatment plan as outlined below. ICD-10-CM ICD-9-CM    1. Wellness examination Z00.00 V70.0    2. Vitamin D insufficiency E55.9 268.9 VITAMIN D, 25 HYDROXY   3. Essential hypertension F31 265.2 METABOLIC PANEL, COMPREHENSIVE   4. Hypothyroidism, unspecified type E03.9 244.9 TSH 3RD GENERATION      T4, FREE   5. Screening, lipid Z13.220 V77.91 LIPID PANEL   6. Encounter for routine laboratory testing C01.20 Y50.26 METABOLIC PANEL, COMPREHENSIVE      CBC WITH AUTOMATED DIFF      LIPID PANEL   7. Lumbar pain M54.5 724.2    8. Mood swings R45.86 296.99      Will discuss her hormones/intermittent mood swings with her GYN and will continue [address] for now since feeling better on it. We discussed multiple treatment options, including cymbalta (potentially will help with her pain as well). She will call of she decides she would like to start this. For now, NSAID for inflammation (aleve or ibuprofen), increase water and daily stretches for back pain. She may consider PT again in future, she says she may check into massage or chiropractor therapy as well. Encouraged use of topical biofreeze after stretching. BP at goal.  Migraines controlled on current treatment.      Discussed health maintenance screening guidelines  Advised on nutrition, physical activity, weight management, tobacco, alcohol and safety  Reviewed and given written instruction, see below    [address] [place] [address] [address] (Included in AVS):  · Attain and/or maintain a healthy weight (BMI between 18 and 30)  · Attain and/or maintain regular physical activity for 30 minutes 5 days/week   · Eat at least 5 servings of fruits and vegetables daily, preferably fresh  · Limit fast food and prepared foods  · Attain and/or maintain healthy blood pressure   · Attain and/or maintain no nicotine/tobacco use status  · Annual flu shot (or nasal mist as appropriate)  · Stay up-to-date with immunizations including tetanus, pertussis, HPV, & pneumonia  · Annual eye exam   · Biannual dental visits  · Annual skin cancer screening  · Annual gynecologic visit for women over age 24  · Annual prostate exam for black men starting at age 36, and for other races starting at age 48 (unless indicated earlier by history)  · Colonscopy every 8 years after age 48 (unless indicated more frequently by history)  · Consider daily 81mg aspirin for men over age 39 and women over age 54  · Annual screening labs: thyroid tests (TSH and T4), complete blood count, lipid panel (cholesterol), hemoglobin A1c (diabetes screening), comprehensive metabolic panel (includes kidney function, liver function, and electrolytes), vitamin D level, urinalysis (with urine culture and microalbumin as medically indicated)  · Consider annual testing for sexually transmitted infections including HIV  · Screening bone density testing in all women over age 72      Routine labs today. Will call or message with those results     Verbal and written instructions (see AVS) provided. Patient expresses understanding and agreement of diagnosis and treatment plan.

## 2020-03-05 NOTE — PROGRESS NOTES
Chief Complaint   Patient presents with    Well Woman     no pap    Medication Refill     1. Have you been to the ER, urgent care clinic since your last visit? Hospitalized since your last visit? No    2. Have you seen or consulted any other health care providers outside of the [address] [place] [address] since your last visit? Include any pap smears or colon screening. No    Health maintenance reviewed. Pt informed of health maintenance past due and/or upcoming. Pt verbalized understanding. Health Maintenance Due   Topic Date Due    PAP AKA CERVICAL CYTOLOGY  10/21/1995     Pt is fasting this visit.      Pt voiced that her PAP and Mammo is next week, at [facility]     3 most recent [address] [place] [address],[address] 3/5/2020   Little interest or pleasure in doing things More than half the days   Feeling down, depressed, irritable, or hopeless More than half the days   Total Score PHQ 2 4

## 2020-03-06 LAB
25(OH)D3+25(OH)D2 SERPL-MCNC: 27.2 NG/ML (ref 30–100)
ALBUMIN SERPL-MCNC: 3.9 G/DL (ref 3.8–4.8)
ALBUMIN/GLOB SERPL: 1.4 {RATIO} (ref 1.2–2.2)
ALP SERPL-CCNC: 116 IU/L (ref 39–117)
ALT SERPL-CCNC: 14 IU/L (ref 0–32)
AST SERPL-CCNC: 13 IU/L (ref 0–40)
BASOPHILS # BLD AUTO: 0.1 X10E3/UL (ref 0–0.2)
BASOPHILS NFR BLD AUTO: 1 %
BILIRUB SERPL-MCNC: 0.4 MG/DL (ref 0–1.2)
BUN SERPL-MCNC: 8 MG/DL (ref 6–24)
BUN/CREAT SERPL: 13 (ref 9–23)
CALCIUM SERPL-MCNC: 9.4 MG/DL (ref 8.7–10.2)
CHLORIDE SERPL-SCNC: 102 MMOL/L (ref 96–106)
CHOLEST SERPL-MCNC: 144 MG/DL (ref 100–199)
CO2 SERPL-SCNC: 22 MMOL/L (ref 20–29)
CREAT SERPL-MCNC: 0.61 MG/DL (ref 0.57–1)
EOSINOPHIL # BLD AUTO: 0.2 X10E3/UL (ref 0–0.4)
EOSINOPHIL NFR BLD AUTO: 2 %
ERYTHROCYTE [DISTWIDTH] IN BLOOD BY AUTOMATED COUNT: 13.1 % (ref 11.7–15.4)
GLOBULIN SER CALC-MCNC: 2.7 G/DL (ref 1.5–4.5)
GLUCOSE SERPL-MCNC: 84 MG/DL (ref 65–99)
HCT VFR BLD AUTO: 41.7 % (ref 34–46.6)
HDLC SERPL-MCNC: 42 MG/DL
HGB BLD-MCNC: 14.3 G/DL (ref 11.1–15.9)
IMM GRANULOCYTES # BLD AUTO: 0.1 X10E3/UL (ref 0–0.1)
IMM GRANULOCYTES NFR BLD AUTO: 1 %
INTERPRETATION, 910389: NORMAL
LDLC SERPL CALC-MCNC: 88 MG/DL (ref 0–99)
LYMPHOCYTES # BLD AUTO: 2.7 X10E3/UL (ref 0.7–3.1)
LYMPHOCYTES NFR BLD AUTO: 26 %
MCH RBC QN AUTO: 28.8 PG (ref 26.6–33)
MCHC RBC AUTO-ENTMCNC: 34.3 G/DL (ref 31.5–35.7)
MCV RBC AUTO: 84 FL (ref 79–97)
MONOCYTES # BLD AUTO: 0.7 X10E3/UL (ref 0.1–0.9)
MONOCYTES NFR BLD AUTO: 6 %
NEUTROPHILS # BLD AUTO: 6.9 X10E3/UL (ref 1.4–7)
NEUTROPHILS NFR BLD AUTO: 64 %
PLATELET # BLD AUTO: 318 X10E3/UL (ref 150–450)
POTASSIUM SERPL-SCNC: 4.8 MMOL/L (ref 3.5–5.2)
PROT SERPL-MCNC: 6.6 G/DL (ref 6–8.5)
RBC # BLD AUTO: 4.96 X10E6/UL (ref 3.77–5.28)
SODIUM SERPL-SCNC: 141 MMOL/L (ref 134–144)
T4 FREE SERPL-MCNC: 1.1 NG/DL (ref 0.82–1.77)
TRIGL SERPL-MCNC: 71 MG/DL (ref 0–149)
TSH SERPL DL<=0.005 MIU/L-ACNC: 1.53 UIU/ML (ref 0.45–4.5)
VLDLC SERPL CALC-MCNC: 14 MG/DL (ref 5–40)
WBC # BLD AUTO: 10.6 X10E3/UL (ref 3.4–10.8)

## 2020-03-09 DIAGNOSIS — G43.009 MIGRAINE WITHOUT AURA AND WITHOUT STATUS MIGRAINOSUS, NOT INTRACTABLE: ICD-10-CM

## 2020-03-09 DIAGNOSIS — K21.9 GASTROESOPHAGEAL REFLUX DISEASE WITHOUT ESOPHAGITIS: ICD-10-CM

## 2020-03-09 NOTE — PROGRESS NOTES
Sent to [employer]:  Hi Ms Versa Fermin,  Labs look great! Vitamin d is a little low. Should be taking vitamin d3 7406-2077 units per day.   Let me know if you have any questions  Best  Elvira Dunn NP

## 2020-03-10 RX ORDER — LANSOPRAZOLE 30 MG/1
30 CAPSULE, DELAYED RELEASE ORAL
Qty: 90 CAP | Refills: 3 | Status: SHIPPED | OUTPATIENT
Start: 2020-03-10 | End: 2021-05-28

## 2020-03-10 RX ORDER — LEVOTHYROXINE SODIUM 50 UG/1
TABLET ORAL
Qty: 90 TAB | Refills: 3 | Status: SHIPPED | OUTPATIENT
Start: 2020-03-10 | End: 2021-02-09

## 2020-03-10 RX ORDER — PROPRANOLOL HYDROCHLORIDE 60 MG/1
60 CAPSULE, EXTENDED RELEASE ORAL DAILY
Qty: 90 CAP | Refills: 3 | Status: SHIPPED | OUTPATIENT
Start: 2020-03-10 | End: 2021-02-09

## 2020-03-10 RX ORDER — LIOTHYRONINE SODIUM 5 UG/1
TABLET ORAL
Qty: 180 TAB | Refills: 2 | Status: SHIPPED | OUTPATIENT
Start: 2020-03-10 | End: 2020-07-13

## 2020-04-04 ENCOUNTER — HOSPITAL ENCOUNTER (EMERGENCY)
Age: 45
Discharge: HOME | End: 2020-04-04
Attending: EMERGENCY MEDICINE | Admitting: EMERGENCY MEDICINE
Payer: COMMERCIAL

## 2020-04-04 ENCOUNTER — APPOINTMENT (OUTPATIENT)
Dept: CT IMAGING | Age: 45
End: 2020-04-04
Attending: EMERGENCY MEDICINE
Payer: COMMERCIAL

## 2020-04-04 VITALS
TEMPERATURE: 98.3 F | WEIGHT: 174.63 LBS | HEART RATE: 75 BPM | DIASTOLIC BLOOD PRESSURE: 73 MMHG | OXYGEN SATURATION: 98 % | BODY MASS INDEX: 32.97 KG/M2 | SYSTOLIC BLOOD PRESSURE: 110 MMHG | HEIGHT: 61 IN | RESPIRATION RATE: 20 BRPM

## 2020-04-04 DIAGNOSIS — N20.0 KIDNEY STONE: Primary | ICD-10-CM

## 2020-04-04 LAB
ALBUMIN SERPL-MCNC: 3.3 G/DL (ref 3.5–5)
ALBUMIN/GLOB SERPL: 0.9 {RATIO} (ref 1.1–2.2)
ALP SERPL-CCNC: 115 U/L (ref 45–117)
ALT SERPL-CCNC: 19 U/L (ref 12–78)
ANION GAP SERPL CALC-SCNC: 5 MMOL/L (ref 5–15)
APPEARANCE UR: ABNORMAL
AST SERPL-CCNC: 14 U/L (ref 15–37)
BACTERIA URNS QL MICRO: ABNORMAL /HPF
BASOPHILS # BLD: 0.1 K/UL (ref 0–0.1)
BASOPHILS NFR BLD: 1 % (ref 0–1)
BILIRUB SERPL-MCNC: 0.3 MG/DL (ref 0.2–1)
BILIRUB UR QL CFM: NEGATIVE
BUN SERPL-MCNC: 10 MG/DL (ref 6–20)
BUN/CREAT SERPL: 15 (ref 12–20)
CALCIUM SERPL-MCNC: 8.2 MG/DL (ref 8.5–10.1)
CHLORIDE SERPL-SCNC: 109 MMOL/L (ref 97–108)
CO2 SERPL-SCNC: 25 MMOL/L (ref 21–32)
COLOR UR: ABNORMAL
CREAT SERPL-MCNC: 0.67 MG/DL (ref 0.55–1.02)
DIFFERENTIAL METHOD BLD: ABNORMAL
EOSINOPHIL # BLD: 0.2 K/UL (ref 0–0.4)
EOSINOPHIL NFR BLD: 2 % (ref 0–7)
EPITH CASTS URNS QL MICRO: ABNORMAL /LPF
ERYTHROCYTE [DISTWIDTH] IN BLOOD BY AUTOMATED COUNT: 13.6 % (ref 11.5–14.5)
GLOBULIN SER CALC-MCNC: 3.6 G/DL (ref 2–4)
GLUCOSE SERPL-MCNC: 120 MG/DL (ref 65–100)
GLUCOSE UR STRIP.AUTO-MCNC: NEGATIVE MG/DL
HCG UR QL: NEGATIVE
HCT VFR BLD AUTO: 40.7 % (ref 35–47)
HGB BLD-MCNC: 13.7 G/DL (ref 11.5–16)
HGB UR QL STRIP: ABNORMAL
IMM GRANULOCYTES # BLD AUTO: 0.1 K/UL (ref 0–0.04)
IMM GRANULOCYTES NFR BLD AUTO: 1 % (ref 0–0.5)
KETONES UR QL STRIP.AUTO: NEGATIVE MG/DL
LEUKOCYTE ESTERASE UR QL STRIP.AUTO: ABNORMAL
LIPASE SERPL-CCNC: 130 U/L (ref 73–393)
LYMPHOCYTES # BLD: 2.8 K/UL (ref 0.8–3.5)
LYMPHOCYTES NFR BLD: 22 % (ref 12–49)
MCH RBC QN AUTO: 28.3 PG (ref 26–34)
MCHC RBC AUTO-ENTMCNC: 33.7 G/DL (ref 30–36.5)
MCV RBC AUTO: 84.1 FL (ref 80–99)
MONOCYTES # BLD: 0.9 K/UL (ref 0–1)
MONOCYTES NFR BLD: 7 % (ref 5–13)
NEUTS SEG # BLD: 9.1 K/UL (ref 1.8–8)
NEUTS SEG NFR BLD: 67 % (ref 32–75)
NITRITE UR QL STRIP.AUTO: POSITIVE
NRBC # BLD: 0 K/UL (ref 0–0.01)
NRBC BLD-RTO: 0 PER 100 WBC
PH UR STRIP: 5.5 [PH] (ref 5–8)
PLATELET # BLD AUTO: 266 K/UL (ref 150–400)
PMV BLD AUTO: 9.9 FL (ref 8.9–12.9)
POTASSIUM SERPL-SCNC: 3.7 MMOL/L (ref 3.5–5.1)
PROT SERPL-MCNC: 6.9 G/DL (ref 6.4–8.2)
PROT UR STRIP-MCNC: 30 MG/DL
RBC # BLD AUTO: 4.84 M/UL (ref 3.8–5.2)
RBC #/AREA URNS HPF: ABNORMAL /HPF (ref 0–5)
SODIUM SERPL-SCNC: 139 MMOL/L (ref 136–145)
SP GR UR REFRACTOMETRY: 1.03 (ref 1–1.03)
UROBILINOGEN UR QL STRIP.AUTO: 0.2 EU/DL (ref 0.2–1)
WBC # BLD AUTO: 13.2 K/UL (ref 3.6–11)
WBC URNS QL MICRO: ABNORMAL /HPF (ref 0–4)

## 2020-04-04 PROCEDURE — 85025 COMPLETE CBC W/AUTO DIFF WBC: CPT

## 2020-04-04 PROCEDURE — 80053 COMPREHEN METABOLIC PANEL: CPT

## 2020-04-04 PROCEDURE — 74177 CT ABD & PELVIS W/CONTRAST: CPT

## 2020-04-04 PROCEDURE — 96375 TX/PRO/DX INJ NEW DRUG ADDON: CPT

## 2020-04-04 PROCEDURE — 96374 THER/PROPH/DIAG INJ IV PUSH: CPT

## 2020-04-04 PROCEDURE — 83690 ASSAY OF LIPASE: CPT

## 2020-04-04 PROCEDURE — 74011250636 HC RX REV CODE- 250/636: Performed by: EMERGENCY MEDICINE

## 2020-04-04 PROCEDURE — 36415 COLL VENOUS BLD VENIPUNCTURE: CPT

## 2020-04-04 PROCEDURE — 81025 URINE PREGNANCY TEST: CPT

## 2020-04-04 PROCEDURE — 99284 EMERGENCY DEPT VISIT MOD MDM: CPT

## 2020-04-04 PROCEDURE — 81001 URINALYSIS AUTO W/SCOPE: CPT

## 2020-04-04 PROCEDURE — 74011636320 HC RX REV CODE- 636/320: Performed by: EMERGENCY MEDICINE

## 2020-04-04 RX ORDER — ONDANSETRON 2 MG/ML
4 INJECTION INTRAMUSCULAR; INTRAVENOUS
Status: COMPLETED | OUTPATIENT
Start: 2020-04-04 | End: 2020-04-04

## 2020-04-04 RX ORDER — FENTANYL CITRATE 50 UG/ML
50 INJECTION, SOLUTION INTRAMUSCULAR; INTRAVENOUS
Status: COMPLETED | OUTPATIENT
Start: 2020-04-04 | End: 2020-04-04

## 2020-04-04 RX ORDER — ST. JOHN'S WORT 300 MG
CAPSULE ORAL
COMMUNITY
End: 2021-09-29 | Stop reason: ALTCHOICE

## 2020-04-04 RX ORDER — CEFDINIR 300 MG/1
300 CAPSULE ORAL 2 TIMES DAILY
Qty: 14 CAP | Refills: 0 | Status: SHIPPED | OUTPATIENT
Start: 2020-04-04 | End: 2020-04-04

## 2020-04-04 RX ORDER — IBUPROFEN 600 MG/1
600 TABLET ORAL
Qty: 20 TAB | Refills: 0 | Status: SHIPPED | OUTPATIENT
Start: 2020-04-04 | End: 2020-09-08 | Stop reason: ALTCHOICE

## 2020-04-04 RX ORDER — CEFDINIR 300 MG/1
300 CAPSULE ORAL 2 TIMES DAILY
Qty: 14 CAP | Refills: 0 | Status: SHIPPED | OUTPATIENT
Start: 2020-04-04 | End: 2020-09-08 | Stop reason: ALTCHOICE

## 2020-04-04 RX ORDER — SODIUM CHLORIDE 0.9 % (FLUSH) 0.9 %
10 SYRINGE (ML) INJECTION
Status: COMPLETED | OUTPATIENT
Start: 2020-04-04 | End: 2020-04-04

## 2020-04-04 RX ORDER — KETOROLAC TROMETHAMINE 30 MG/ML
15 INJECTION, SOLUTION INTRAMUSCULAR; INTRAVENOUS
Status: COMPLETED | OUTPATIENT
Start: 2020-04-04 | End: 2020-04-04

## 2020-04-04 RX ORDER — ONDANSETRON 8 MG/1
8 TABLET, ORALLY DISINTEGRATING ORAL
Qty: 12 TAB | Refills: 0 | Status: SHIPPED | OUTPATIENT
Start: 2020-04-04 | End: 2020-09-08 | Stop reason: ALTCHOICE

## 2020-04-04 RX ADMIN — FENTANYL CITRATE 50 MCG: 50 INJECTION INTRAMUSCULAR; INTRAVENOUS at 20:02

## 2020-04-04 RX ADMIN — Medication 10 ML: at 22:05

## 2020-04-04 RX ADMIN — IOPAMIDOL 100 ML: 755 INJECTION, SOLUTION INTRAVENOUS at 22:05

## 2020-04-04 RX ADMIN — KETOROLAC TROMETHAMINE 15 MG: 30 INJECTION, SOLUTION INTRAMUSCULAR at 20:02

## 2020-04-04 RX ADMIN — SODIUM CHLORIDE 1000 ML: 900 INJECTION, SOLUTION INTRAVENOUS at 20:02

## 2020-04-04 RX ADMIN — ONDANSETRON 4 MG: 2 INJECTION INTRAMUSCULAR; INTRAVENOUS at 20:02

## 2020-04-04 NOTE — LETTER
[address] 
[facility] EMERGENCY DEPT 
[address] [address] [phone] Work/School Note Date: 4/4/2020 To Whom It May concern: 
 
Jason Bello was seen and treated today in the emergency room by the following provider(s): 
Attending Provider: Esperanza Cedeño DO Physician: Oscar Herbert MD.   
 
Jeremiah Caldwell may return to work on 4/7/2020.  
 
Sincerely, 
 
 
 
 
Shahida Gong RN

## 2020-04-05 NOTE — DISCHARGE INSTRUCTIONS
Patient Education   [facility] Kidney Stone Hotline    The [facility] is a resource to assist you when experiencing the symptoms of a kidney stone. Call the [facility] hotline at [phone]) [phone]. It is available 24/7. When you call this number, a staff member will coordinate appropriate care by either scheduling you a timely appointment at our office or directing you to immediate care, as needed. Kidney Stone: Care Instructions  Your Care Instructions    Kidney stones are formed when salts, minerals, and other substances normally found in the urine clump together. They can be as small as grains of sand or, rarely, as large as golf balls. While the stone is traveling through the ureter, which is the tube that carries urine from the kidney to the bladder, you will probably feel pain. The pain may be mild or very severe. You may also have some blood in your urine. As soon as the stone reaches the bladder, any intense pain should go away. If a stone is too large to pass on its own, you may need a medical procedure to help you pass the stone. The doctor has checked you carefully, but problems can develop later. If you notice any problems or new symptoms, get medical treatment right away. Follow-up care is a key part of your treatment and safety. Be sure to make and go to all appointments, and call your doctor if you are having problems. It's also a good idea to know your test results and keep a list of the medicines you take. How can you care for yourself at home? · Drink plenty of fluids, enough so that your urine is light yellow or clear like water. If you have kidney, heart, or liver disease and have to limit fluids, talk with your doctor before you increase the amount of fluids you drink. · Take pain medicines exactly as directed. Call your doctor if you think you are having a problem with your medicine.   ? If the doctor gave you a prescription medicine for pain, take it as prescribed. ? If you are not taking a prescription pain medicine, ask your doctor if you can take an over-the-counter medicine. Read and follow all instructions on the label. · Your doctor may ask you to strain your urine so that you can collect your kidney stone when it passes. You can use a kitchen strainer or a tea strainer to catch the stone. Store it in a plastic bag until you see your doctor again. Preventing future kidney stones  Some changes in your diet may help prevent kidney stones. Depending on the cause of your stones, your doctor may recommend that you:  · Drink plenty of fluids, enough so that your urine is light yellow or clear like water. If you have kidney, heart, or liver disease and have to limit fluids, talk with your doctor before you increase the amount of fluids you drink. · Limit coffee, tea, and alcohol. Also avoid grapefruit juice. · Do not take more than the recommended daily dose of vitamins C and D.  · Avoid antacids such as Gaviscon, Maalox, Mylanta, or Tums. · Limit the amount of salt (sodium) in your diet. · Eat a balanced diet that is not too high in protein. · Limit foods that are high in a substance called oxalate, which can cause kidney stones. These foods include dark green vegetables, rhubarb, chocolate, wheat bran, nuts, cranberries, and beans. When should you call for help? Call your doctor now or seek immediate medical care if:    · You cannot keep down fluids.     · Your pain gets worse.     · You have a fever or chills.     · You have new or worse pain in your back just below your rib cage (the flank area).     · You have new or more blood in your urine.    Watch closely for changes in your health, and be sure to contact your doctor if:    · You do not get better as expected. Where can you learn more? Go to http://saray-leydi.info/  Enter R969 in the search box to learn more about \"Kidney Stone: Care Instructions. \"  Current as of: August 11, 2019Content Version: 12.4  © 9831-0450 Healthwise, Incorporated. Care instructions adapted under license by [employer] (which disclaims liability or warranty for this information). If you have questions about a medical condition or this instruction, always ask your healthcare professional. [address] any warranty or liability for your use of this information.

## 2020-04-05 NOTE — ED NOTES
Patient discharged by Dr. Whitney Marinelli. Patient provided with discharge instructions Rx and instructions on follow up care. Patient out of ED ambulatory accompanied by self.

## 2020-04-05 NOTE — ED PROVIDER NOTES
EMERGENCY DEPARTMENT HISTORY AND PHYSICAL EXAM      Date: 4/4/2020  Patient Name: Reed Briceño    Please note that this dictation was completed with [employer], the computer voice recognition software. Quite often unanticipated grammatical, syntax, homophones, and other interpretive errors are inadvertently transcribed by the computer software. Please disregard these errors. Please excuse any errors that have escaped final proofreading. History of Presenting Illness     Chief Complaint   Patient presents with    Abdominal Pain     LRQ pain starting today at 10am, gotten worse all day. Radiates to right groin and to flank. Denies urinary symptoms. Denies cough, fever, SOB, CP. 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 History Provided By: Patient    HPI: Reed Briceño, 38 y.o. female, with a past medical history significant for hypothyroid, status post cholecystectomy presenting the emergency department complaining of abdominal pain. Pain is in the right lower quadrant, radiates to the right back. Associated with nausea but no vomiting. Pain is described as severe. It is constant, waxes and wanes. Nothing makes it better, nothing makes it worse. No urinary symptoms, no vaginal bleeding or discharge. Denies chance of pregnancy. PCP: Steven Kirkland NP    No current facility-administered medications on file prior to encounter. Current Outpatient Medications on File Prior to Encounter   Medication Sig Dispense Refill    liothyronine (CYTOMEL) 5 mcg tablet TAKE 2 TABLETS DAILY 180 Tab 2    propranolol LA (INDERAL LA) 60 mg SR capsule Take 1 Cap by mouth daily. 90 Cap 3    levothyroxine (SYNTHROID) 50 mcg tablet TAKE 1 TABLET DAILY BEFORE BREAKFAST 90 Tab 3    lansoprazole (PREVACID) 30 mg capsule Take 1 Cap by mouth Daily (before breakfast). 90 Cap 3    CHOLECALCIFEROL, VITAMIN D3, (VITAMIN D3 PO) Take  by mouth daily.       vitamin e 800 unit capsule Take  by mouth daily.  ashley's wort 300 mg cap [place]'s wort      ondansetron hcl (ZOFRAN) 4 mg tablet Take 1 Tab by mouth every eight (8) hours as needed for Nausea. 30 Tab 0    rizatriptan (MAXALT) 10 mg tablet Take 10 mg by mouth once as needed for Migraine. May repeat in 2 hours if needed         Past History     Past Medical History:  Past Medical History:   Diagnosis Date    HTN (hypertension)     Migraine     Nausea & vomiting     Thyroid disease     hypothyroid       Past Surgical History:  Past Surgical History:   Procedure Laterality Date    HX CHOLECYSTECTOMY      HX DILATION AND CURETTAGE      HX GI         Family History:  Family History   Problem Relation Age of Onset    Cancer Paternal Grandfather     Stroke Paternal Grandfather     Cancer Maternal Grandmother     Thyroid Disease Maternal Grandmother     Hypertension Maternal Grandfather     Hypertension Father    24 [facility]\"" Glaucoma Father     Hypertension Mother        Social History:  Social History     Tobacco Use    Smoking status: Former Smoker     Packs/day: 0.50     Years: 4.00     Pack years: 2.00    Smokeless tobacco: Never Used    Tobacco comment: no vaping   Substance Use Topics    Alcohol use: No    Drug use: No       Allergies: Allergies   Allergen Reactions    Codeine Nausea Only    Morphine Itching and Nausea and Vomiting    Percocet [Oxycodone-Acetaminophen] Nausea and Vomiting    Sulfa (Sulfonamide Antibiotics) Itching    Vicodin [Hydrocodone-Acetaminophen] Nausea and Vomiting         Review of Systems   Review of Systems   Constitutional: Negative for chills and fever. HENT: Negative for congestion and sore throat. Eyes: Negative for visual disturbance. Respiratory: Negative for cough and shortness of breath. Cardiovascular: Negative for chest pain and leg swelling. Gastrointestinal: Positive for abdominal pain and nausea. Negative for blood in stool, diarrhea and vomiting. Endocrine: Negative for polyuria. Genitourinary: Positive for flank pain. Negative for dysuria, vaginal bleeding and vaginal discharge. Musculoskeletal: Negative for myalgias. Skin: Negative for rash. Allergic/Immunologic: Negative for immunocompromised state. Neurological: Negative for weakness and headaches. Psychiatric/Behavioral: Negative for confusion. Physical Exam   Physical Exam  Vitals signs and nursing note reviewed. Constitutional:       Appearance: She is well-developed. HENT:      Head: Normocephalic and atraumatic. Eyes:      General:         Right eye: No discharge. Left eye: No discharge. Conjunctiva/sclera: Conjunctivae normal.      Pupils: Pupils are equal, round, and reactive to light. Neck:      Musculoskeletal: Normal range of motion and neck supple. Trachea: No tracheal deviation. Cardiovascular:      Rate and Rhythm: Normal rate and regular rhythm. Heart sounds: Normal heart sounds. No murmur. Pulmonary:      Effort: Pulmonary effort is normal. No respiratory distress. Breath sounds: Normal breath sounds. No wheezing or rales. Abdominal:      General: Bowel sounds are normal.      Palpations: Abdomen is soft. Tenderness: There is abdominal tenderness in the right upper quadrant and right lower quadrant. There is right CVA tenderness. There is no guarding or rebound. Musculoskeletal: Normal range of motion. General: No tenderness or deformity. Skin:     General: Skin is warm and dry. Findings: No erythema or rash. Neurological:      Mental Status: She is alert and oriented to person, place, and time. Psychiatric:         Behavior: Behavior normal.         Diagnostic Study Results     Labs -     No results found for this or any previous visit (from the past 12 hour(s)). Radiologic Studies -   CT ABD PELV W CONT   Final Result   IMPRESSION:      1.  Mild right-sided hydronephrosis due to  5 mm stone lodged in the proximal   right ureter        CT Results  (Last 48 hours)               04/04/20 2240  CT ABD PELV W CONT Final result    Impression:  IMPRESSION:       1. Mild right-sided hydronephrosis due to  5 mm stone lodged in the proximal   right ureter       Narrative:  EXAM: CT ABD PELV W CONT       INDICATION: Abdominal pain, RLQ       COMPARISON: 11/2/2013        CONTRAST: 100 mL of Isovue-370. TECHNIQUE:    Following the uneventful intravenous administration of contrast, thin axial   images were obtained through the abdomen and pelvis. Coronal and sagittal   reconstructions were generated. Oral contrast was not administered. CT dose   reduction was achieved through use of a standardized protocol tailored for this   examination and automatic exposure control for dose modulation. FINDINGS:    LOWER THORAX: No significant abnormality in the incidentally imaged lower chest.   LIVER: No mass. BILIARY TREE: Surgically absent CBD is not dilated. SPLEEN: within normal limits. PANCREAS: No mass or ductal dilatation. ADRENALS: Unremarkable. KIDNEYS: Mild right-sided hydronephrosis due to a 5 mm stone in the proximal   right ureter no left-sided hydronephrosis or stone   STOMACH: Unremarkable. SMALL BOWEL: No dilatation or wall thickening. COLON: Few scattered left-sided diverticula. No evidence of diverticulitis   APPENDIX: Unremarkable. PERITONEUM: No ascites or pneumoperitoneum. RETROPERITONEUM: No lymphadenopathy or aortic aneurysm. REPRODUCTIVE ORGANS: Not enlarged   URINARY BLADDER: No mass or calculus. BONES: No destructive bone lesion. ABDOMINAL WALL: Small umbilical hernia containing fat   ADDITIONAL COMMENTS: N/A               CXR Results  (Last 48 hours)    None            Medical Decision Making   I am the first provider for this patient.     I reviewed the vital signs, available nursing notes, past medical history, past surgical history, family history and social history. Vital Signs-Reviewed the patient's vital signs. No data found. Records Reviewed: Nursing Notes and Old Medical Records    Provider Notes (Medical Decision Making):   Patient presents with abdominal pain. DDx: Gastroenteritis, SBO, appendicitis, colitis, IBD, diverticulitis, mesenteric ischemia, AAA or descending dissection, ACS, ureteral stone. Will get labs and CT Abdomen/pelvis. ED Course:   Initial assessment performed. The patients presenting problems have been discussed, and they are in agreement with the care plan formulated and outlined with them. I have encouraged them to ask questions as they arise throughout their visit. Critical Care Time:   none    Disposition:  DISCHARGE NOTE  Patients results have been reviewed with them. Patient and/or family have verbally conveyed their understanding and agreement of the patient's signs, symptoms, diagnosis, treatment and prognosis and additionally agree to follow up as recommended or return to the Emergency Room should their condition change or have any new concerns prior to their follow-up appointment. Patient verbally agrees with the care-plan and verbally conveys that all of their questions have been answered. Discharge instructions have also been provided to the patient with some educational information regarding their diagnosis as well a list of reasons why they would want to return to the ER prior to their follow-up appointment should their condition change. PLAN:  1.    Discharge Medication List as of 4/4/2020 11:39 PM      START taking these medications    Details   ibuprofen (MOTRIN) 600 mg tablet Take 1 Tab by mouth every six (6) hours as needed for Pain., Normal, Disp-20 Tab, R-0      ondansetron (Zofran ODT) 8 mg disintegrating tablet Take 1 Tab by mouth every eight (8) hours as needed for Nausea or Vomiting., Normal, Disp-12 Tab, R-0      cefdinir (OMNICEF) 300 mg capsule Take 1 Cap by mouth two (2) times a day., Print, Disp-14 Cap, R-0         CONTINUE these medications which have NOT CHANGED    Details   liothyronine (CYTOMEL) 5 mcg tablet TAKE 2 TABLETS DAILY, Normal, Disp-180 Tab, R-2      propranolol LA (INDERAL LA) 60 mg SR capsule Take 1 Cap by mouth daily. , Normal, Disp-90 Cap, R-3      levothyroxine (SYNTHROID) 50 mcg tablet TAKE 1 TABLET DAILY BEFORE BREAKFAST, Normal, Disp-90 Tab, R-3      lansoprazole (PREVACID) 30 mg capsule Take 1 Cap by mouth Daily (before breakfast). , Normal, Disp-90 Cap, R-3      CHOLECALCIFEROL, VITAMIN D3, (VITAMIN D3 PO) Take  by mouth daily. , Historical Med      vitamin e 800 unit capsule Take  by mouth daily. Historical Med      natalie's wort 300 mg cap Natalie's wort, Historical Med      ondansetron hcl (ZOFRAN) 4 mg tablet Take 1 Tab by mouth every eight (8) hours as needed for Nausea., Normal, Disp-30 Tab, R-0      rizatriptan (MAXALT) 10 mg tablet Take 10 mg by mouth once as needed for Migraine. May repeat in 2 hours if needed, Historical Med           2. Follow-up Information     Follow up With Specialties Details Why [address] [facility]  Call in 2 days  [address] [facility] [address],  Pediatrics Call in 2 days  [address]  [address] [address] [phone]      [facility] [address] DEPT Emergency Medicine  As needed, If symptoms worsen [address]  [address] [facility]  [phone]          Return to ED if worse     Diagnosis     Clinical Impression:   1. Kidney stone        Attestations:   This note was completed by Rosy Shanks DO

## 2020-07-13 RX ORDER — LIOTHYRONINE SODIUM 5 UG/1
TABLET ORAL
Qty: 180 TAB | Refills: 0 | Status: SHIPPED | OUTPATIENT
Start: 2020-07-13 | End: 2020-10-04

## 2020-09-08 ENCOUNTER — VIRTUAL VISIT (OUTPATIENT)
Dept: FAMILY MEDICINE CLINIC | Age: 45
End: 2020-09-08
Payer: COMMERCIAL

## 2020-09-08 DIAGNOSIS — F34.1 DYSTHYMIA: Primary | ICD-10-CM

## 2020-09-08 PROCEDURE — 99213 OFFICE O/P EST LOW 20 MIN: CPT | Performed by: FAMILY MEDICINE

## 2020-09-08 RX ORDER — VENLAFAXINE HYDROCHLORIDE 75 MG/1
75 CAPSULE, EXTENDED RELEASE ORAL DAILY
Qty: 90 CAP | Refills: 1 | Status: SHIPPED | OUTPATIENT
Start: 2020-09-08 | End: 2020-12-22 | Stop reason: SDUPTHER

## 2020-09-08 RX ORDER — HYDROXYZINE 25 MG/1
12.5 TABLET, FILM COATED ORAL
COMMUNITY

## 2020-09-08 NOTE — PROGRESS NOTES
Chief Complaint   Patient presents with    Depression     1. Have you been to the ER, urgent care clinic since your last visit? Hospitalized since your last visit? No    2. Have you seen or consulted any other health care providers outside of the [address] [place] [address] since your last visit? Include any pap smears or colon screening.  No    Health Maintenance Due   Topic Date Due    PAP AKA CERVICAL CYTOLOGY  10/21/1995    Flu Vaccine (1) 09/01/2020

## 2020-09-08 NOTE — PROGRESS NOTES
Chief Complaint   Patient presents with    Depression     Pt was seen via virtual video visit. Pt reports that she has had a really hard summer, difficulties in her marriage. Pt reports that her  has alcohol issues, there was an affair. Pt reports that she started counseling, is participating with [facility]. Pt reports that she felt that she was doing well, but recently realized that maybe she was just staying really busy. Pt has realized that maybe she needs more help, having low moods, difficulty motivating, crying easily. Pt restarted [address]. Pt is a nurse, has also had increased stress with work. Pt was atarax by her OB- has only taken occasionally. Yordy Zavala is a 38 y.o. female who was seen by synchronous (real-time) audio-video technology on 9/8/2020 for Depression        Assessment & Plan:   Diagnoses and all orders for this visit:    1. Dysthymia  -     venlafaxine-SR (EFFEXOR-XR) 75 mg capsule; Take 1 Cap by mouth daily. Reviewed possible side effects, advised to continue counseling. Subjective:       Prior to Admission medications    Medication Sig Start Date End Date Taking? Authorizing Provider   hydrOXYzine HCL (ATARAX) 25 mg tablet Take 12.5 mg by mouth three (3) times daily as needed. Yes Provider, Historical   venlafaxine-SR (EFFEXOR-XR) 75 mg capsule Take 1 Cap by mouth daily. 9/8/20  Yes Karen Strauss MD   liothyronine (CYTOMEL) 5 mcg tablet TAKE 2 TABLETS DAILY 7/13/20  Yes Laure Estrada NP   ashley's wort 300 mg cap Ashley's wort   Yes Other, MD Cory   propranolol LA (INDERAL LA) 60 mg SR capsule Take 1 Cap by mouth daily. 3/10/20  Yes Laure Estrada NP   levothyroxine (SYNTHROID) 50 mcg tablet TAKE 1 TABLET DAILY BEFORE BREAKFAST 3/10/20  Yes Laure Estrada NP   lansoprazole (PREVACID) 30 mg capsule Take 1 Cap by mouth Daily (before breakfast).  3/10/20  Yes Laure Estrada NP   ibuprofen (MOTRIN) 600 mg tablet Take 1 Tab by mouth every six (6) hours as needed for Pain. 4/4/20 9/8/20  Sherif Chandler DO   ondansetron (Zofran ODT) 8 mg disintegrating tablet Take 1 Tab by mouth every eight (8) hours as needed for Nausea or Vomiting. 4/4/20 9/8/20  Sherif Chandler DO   cefdinir (OMNICEF) 300 mg capsule Take 1 Cap by mouth two (2) times a day. 4/4/20 9/8/20  Desmond Mcclendon MD   ondansetron hcl (ZOFRAN) 4 mg tablet Take 1 Tab by mouth every eight (8) hours as needed for Nausea. 4/19/18 9/8/20  Parish Bowles MD   rizatriptan (MAXALT) 10 mg tablet Take 10 mg by mouth once as needed for Migraine. May repeat in 2 hours if needed  9/8/20  Provider, Historical   CHOLECALCIFEROL, VITAMIN D3, (VITAMIN D3 PO) Take  by mouth daily. 9/8/20  Provider, Historical   vitamin e 800 unit capsule Take  by mouth daily. 9/8/20  Provider, Historical     Patient Active Problem List   Diagnosis Code    Hypothyroidism E03.9    Obesity E66.9    Endometrial polyp N84.0    Menorrhagia N92.0    Thyroid disease E07.9    HTN (hypertension) I10       Review of Systems   Constitutional: Negative for chills, fever and malaise/fatigue. HENT: Negative for congestion and sore throat. Respiratory: Negative for cough and shortness of breath. Cardiovascular: Negative for chest pain and palpitations. Gastrointestinal: Negative for abdominal pain, heartburn, nausea and vomiting. Genitourinary: Negative for dysuria and urgency. Musculoskeletal: Negative for joint pain and myalgias. Neurological: Negative for dizziness, tingling and headaches. Psychiatric/Behavioral: Positive for depression. All other systems reviewed and are negative.       Objective:     Patient-Reported Vitals 9/8/2020   Patient-Reported Weight 168lb   Patient-Reported Temperature 97.8   Patient-Reported LMP 9-6-20        [INSTRUCTIONS:  \"[x]\" Indicates a positive item  \"[]\" Indicates a negative item  -- DELETE ALL ITEMS NOT EXAMINED]    Constitutional: [x] Appears well-developed and well-nourished [x] No apparent distress      [] Abnormal -     Mental status: [x] Alert and awake  [x] Oriented to person/place/time [x] Able to follow commands    [] Abnormal -     Eyes:   EOM    [x]  Normal    [] Abnormal -   Sclera  [x]  Normal    [] Abnormal -          Discharge [x]  None visible   [] Abnormal -     HENT: [x] Normocephalic, atraumatic  [] Abnormal -   [x] Mouth/Throat: Mucous membranes are moist    External Ears [x] Normal  [] Abnormal -    Neck: [x] No visualized mass [] Abnormal -     Pulmonary/Chest: [x] Respiratory effort normal   [x] No visualized signs of difficulty breathing or respiratory distress        [] Abnormal -      Musculoskeletal:   [x] Normal gait with no signs of ataxia         [x] Normal range of motion of neck        [] Abnormal -     Neurological:        [x] No Facial Asymmetry (Cranial nerve 7 motor function) (limited exam due to video visit)          [x] No gaze palsy        [] Abnormal -          Skin:        [x] No significant exanthematous lesions or discoloration noted on facial skin         [] Abnormal -            Psychiatric:       [x] Normal Affect [] Abnormal -        [x] No Hallucinations    Other pertinent observable physical exam findings:-        We discussed the expected course, resolution and complications of the diagnosis(es) in detail. Medication risks, benefits, costs, interactions, and alternatives were discussed as indicated. I advised her to contact the office if her condition worsens, changes or fails to improve as anticipated. She expressed understanding with the diagnosis(es) and plan. Aguedaelypatel Taylor, who was evaluated through a patient-initiated, synchronous (real-time) audio-video encounter, and/or her healthcare decision maker, is aware that it is a billable service, with coverage as determined by her insurance carrier. She provided verbal consent to proceed: Yes, and patient identification was verified.  It was conducted pursuant to the emergency declaration under the [address], [address] [place] [address] authority and the [facility] and [employer] Act. A caregiver was present when appropriate. Ability to conduct physical exam was limited. I was in the office. The patient was at home.       Jessica Grant MD

## 2020-10-04 RX ORDER — LIOTHYRONINE SODIUM 5 UG/1
TABLET ORAL
Qty: 180 TAB | Refills: 0 | Status: SHIPPED | OUTPATIENT
Start: 2020-10-04 | End: 2020-12-28

## 2020-12-28 RX ORDER — LIOTHYRONINE SODIUM 5 UG/1
TABLET ORAL
Qty: 180 TAB | Refills: 0 | Status: SHIPPED | OUTPATIENT
Start: 2020-12-28 | End: 2021-03-26

## 2021-02-09 DIAGNOSIS — G43.009 MIGRAINE WITHOUT AURA AND WITHOUT STATUS MIGRAINOSUS, NOT INTRACTABLE: ICD-10-CM

## 2021-02-09 RX ORDER — LEVOTHYROXINE SODIUM 50 UG/1
TABLET ORAL
Qty: 90 TAB | Refills: 3 | Status: SHIPPED | OUTPATIENT
Start: 2021-02-09 | End: 2022-03-02 | Stop reason: SDUPTHER

## 2021-02-09 RX ORDER — PROPRANOLOL HYDROCHLORIDE 60 MG/1
CAPSULE, EXTENDED RELEASE ORAL
Qty: 90 CAP | Refills: 3 | Status: SHIPPED | OUTPATIENT
Start: 2021-02-09 | End: 2022-09-11 | Stop reason: SDUPTHER

## 2021-03-26 RX ORDER — LIOTHYRONINE SODIUM 5 UG/1
TABLET ORAL
Qty: 180 TAB | Refills: 0 | Status: SHIPPED | OUTPATIENT
Start: 2021-03-26 | End: 2021-06-28

## 2021-03-26 NOTE — TELEPHONE ENCOUNTER
Needs follow up with labs prior to next refill.   I have sent this one for now but has not been seen in our office since last March

## 2021-05-26 ENCOUNTER — TELEPHONE (OUTPATIENT)
Dept: FAMILY MEDICINE CLINIC | Age: 46
End: 2021-05-26

## 2021-05-26 DIAGNOSIS — F34.1 DYSTHYMIA: ICD-10-CM

## 2021-05-26 RX ORDER — VENLAFAXINE HYDROCHLORIDE 75 MG/1
75 CAPSULE, EXTENDED RELEASE ORAL DAILY
Qty: 90 CAPSULE | Refills: 1 | Status: SHIPPED | OUTPATIENT
Start: 2021-05-26 | End: 2021-05-26 | Stop reason: SDUPTHER

## 2021-05-26 RX ORDER — VENLAFAXINE HYDROCHLORIDE 75 MG/1
75 CAPSULE, EXTENDED RELEASE ORAL DAILY
Qty: 30 CAPSULE | Refills: 0 | Status: SHIPPED | OUTPATIENT
Start: 2021-05-26 | End: 2021-10-08 | Stop reason: SDUPTHER

## 2021-05-27 DIAGNOSIS — K21.9 GASTROESOPHAGEAL REFLUX DISEASE WITHOUT ESOPHAGITIS: ICD-10-CM

## 2021-05-27 NOTE — TELEPHONE ENCOUNTER
----- Message from [address] [place] [address] Rd. Remediosfernando sent at 5/26/2021  9:28 AM EDT -----  Regarding: Prescription Question  Contact: [phone]  I ran out of effexor/venaflexin. I didn't realize I had no refills left. [facility] [address] for a month supply or a 2 week supply? Then also send the 3 month rx to [facility]? I just dont want to stop it suddenly while I wait foe the mail order one. I appreciate your help! Hope you are doing well!

## 2021-05-28 RX ORDER — LANSOPRAZOLE 30 MG/1
CAPSULE, DELAYED RELEASE ORAL
Qty: 90 CAPSULE | Refills: 0 | Status: SHIPPED | OUTPATIENT
Start: 2021-05-28 | End: 2021-08-27

## 2021-06-28 RX ORDER — LIOTHYRONINE SODIUM 5 UG/1
TABLET ORAL
Qty: 180 TABLET | Refills: 0 | Status: SHIPPED | OUTPATIENT
Start: 2021-06-28 | End: 2021-09-23 | Stop reason: SDUPTHER

## 2021-08-25 DIAGNOSIS — K21.9 GASTROESOPHAGEAL REFLUX DISEASE WITHOUT ESOPHAGITIS: ICD-10-CM

## 2021-08-27 RX ORDER — LANSOPRAZOLE 30 MG/1
CAPSULE, DELAYED RELEASE ORAL
Qty: 90 CAPSULE | Refills: 0 | Status: SHIPPED | OUTPATIENT
Start: 2021-08-27 | End: 2021-11-16 | Stop reason: SDUPTHER

## 2021-09-23 RX ORDER — LIOTHYRONINE SODIUM 5 UG/1
TABLET ORAL
Qty: 180 TABLET | Refills: 0 | Status: SHIPPED | OUTPATIENT
Start: 2021-09-23 | End: 2021-12-16 | Stop reason: SDUPTHER

## 2021-09-23 NOTE — TELEPHONE ENCOUNTER
Requested Prescriptions     Pending Prescriptions Disp Refills    liothyronine (CYTOMEL) 5 mcg tablet 180 Tablet 0   90 day

## 2021-09-23 NOTE — TELEPHONE ENCOUNTER
Needs follow up scheduled, has not been seen in over a year. Once scheduled, I can send refill.   Thanks

## 2021-09-29 ENCOUNTER — OFFICE VISIT (OUTPATIENT)
Dept: FAMILY MEDICINE CLINIC | Age: 46
End: 2021-09-29
Payer: COMMERCIAL

## 2021-09-29 VITALS
HEART RATE: 76 BPM | WEIGHT: 188 LBS | SYSTOLIC BLOOD PRESSURE: 131 MMHG | DIASTOLIC BLOOD PRESSURE: 85 MMHG | RESPIRATION RATE: 18 BRPM | BODY MASS INDEX: 35.5 KG/M2 | HEIGHT: 61 IN | OXYGEN SATURATION: 98 %

## 2021-09-29 DIAGNOSIS — I10 ESSENTIAL HYPERTENSION: ICD-10-CM

## 2021-09-29 DIAGNOSIS — R10.9 LEFT FLANK PAIN: ICD-10-CM

## 2021-09-29 DIAGNOSIS — Z87.442 HISTORY OF KIDNEY STONES: ICD-10-CM

## 2021-09-29 DIAGNOSIS — J01.10 ACUTE NON-RECURRENT FRONTAL SINUSITIS: ICD-10-CM

## 2021-09-29 DIAGNOSIS — E55.9 VITAMIN D INSUFFICIENCY: ICD-10-CM

## 2021-09-29 DIAGNOSIS — Z13.220 SCREENING, LIPID: ICD-10-CM

## 2021-09-29 DIAGNOSIS — E03.9 HYPOTHYROIDISM, UNSPECIFIED TYPE: Primary | ICD-10-CM

## 2021-09-29 DIAGNOSIS — Z11.59 ENCOUNTER FOR HEPATITIS C SCREENING TEST FOR LOW RISK PATIENT: ICD-10-CM

## 2021-09-29 PROCEDURE — 99214 OFFICE O/P EST MOD 30 MIN: CPT | Performed by: NURSE PRACTITIONER

## 2021-09-29 RX ORDER — DOXYCYCLINE 100 MG/1
100 TABLET ORAL 2 TIMES DAILY
Qty: 20 TABLET | Refills: 0 | Status: SHIPPED | OUTPATIENT
Start: 2021-09-29 | End: 2021-10-09

## 2021-09-29 RX ORDER — TAMSULOSIN HYDROCHLORIDE 0.4 MG/1
0.4 CAPSULE ORAL DAILY
Qty: 30 CAPSULE | Refills: 0 | Status: SHIPPED | OUTPATIENT
Start: 2021-09-29

## 2021-09-29 NOTE — PATIENT INSTRUCTIONS
Hypothyroidism: Care Instructions  Your Care Instructions     When you have hypothyroidism, your body doesn't make enough thyroid hormone. This hormone helps your body use energy. If your thyroid level is low, you may feel tired, be constipated, have an increase in your blood pressure, or have dry skin or memory problems. You may also get cold easily, even when it is warm. Women with low thyroid levels may have heavy menstrual periods. A blood test to find your thyroid-stimulating hormone (TSH) level is used to check for hypothyroidism. A high TSH level may mean that you have it. The treatment for hypothyroidism is thyroid hormone pills. You should start to feel better in 1 to 2 weeks. Most people need treatment for the rest of their lives. You will need regular visits with your doctor to make sure you are doing well and that you have the right dose of medicine. Follow-up care is a key part of your treatment and safety. Be sure to make and go to all appointments, and call your doctor if you are having problems. It's also a good idea to know your test results and keep a list of the medicines you take. How can you care for yourself at home? · Take your thyroid hormone medicine exactly as prescribed. Call your doctor if you think you are having a problem with your medicine. Most people do not have side effects if they take the right amount of medicine regularly. ? Take the medicine 30 minutes before breakfast, and do not take it with calcium, vitamins, or iron. ? Do not take extra doses of your thyroid medicine. It will not help you get better any faster, and it may cause side effects. ? If you forget to take a dose, do NOT take a double dose of medicine. Take your usual dose the next day. · Tell your doctor about all prescription, herbal, or over-the-counter products you take. · Take care of yourself. Eat a healthy diet, get enough sleep, and get regular exercise. When should you call for help?    Call 911 anytime you think you may need emergency care. For example, call if:    · You passed out (lost consciousness).     · You have severe trouble breathing.     · You have a very slow heartbeat (less than 60 beats a minute).     · You have a low body temperature (95°F or below). Call your doctor now or seek immediate medical care if:    · You feel tired, sluggish, or weak.     · You have trouble remembering things or concentrating.     · You do not begin to feel better 2 weeks after starting your medicine. Watch closely for changes in your health, and be sure to contact your doctor if you have any problems. Where can you learn more? Go to http://www.gray.com/  Enter N181 in the search box to learn more about \"Hypothyroidism: Care Instructions. \"  Current as of: December 2, 2020               Content Version: 13.0  © 9237-4088 [employer]wise, Incorporated. Care instructions adapted under license by [employer] (which disclaims liability or warranty for this information). If you have questions about a medical condition or this instruction, always ask your healthcare professional. [address] any warranty or liability for your use of this information.

## 2021-09-29 NOTE — PROGRESS NOTES
Chief Complaint   Patient presents with    Medication Evaluation     follow-up     Health Maintenance reviewed     1. Have you been to the ER, urgent care clinic since your last visit? Hospitalized since your last visit? No     2. Have you seen or consulted any other health care providers outside of the [address] [place] [address] since your last visit? Include any pap smears or colon screening.   Yes [address] [facility] urology

## 2021-09-29 NOTE — PROGRESS NOTES
Subjective:     Chief Complaint   Patient presents with    Medication Evaluation     follow-up        HPI:  54 y.o.  presents for follow up appointment. Hypothyroid  Taking Cytomel 10 mcg daily and Levothyroxine 50 mcg daily  Has had some feeling of fatigue over the past few weeks with her sinus congestion. No new  hair loss. No palpitations. No weight gain or temperature intolerance     Thinks she might have sinus infection. For the past three weeks has has sinus pressure and headache. Has tried Tylenol sinus,Allergy medicine, flonase, Benadryl, mucinex, Sudafed. Has been tested for covid twice. Not having severe nasal congestion. Started having left flank pain last night. Worries she might have a kidney stone, has history of kidney stone in the past.  Pain is coming in waves, deep pain that when it hits she cannot get comfortable. Tried to wean off effexor but started to feel down so she stayed on it. It is helping with hot flashes as well. No hospital, ER or specialist visits since last primary care visit except as noted above. Past Medical History:   Diagnosis Date    HTN (hypertension)     Migraine     Nausea & vomiting     Thyroid disease     hypothyroid       Social History     Tobacco Use    Smoking status: Former Smoker     Packs/day: 0.50     Years: 4.00     Pack years: 2.00    Smokeless tobacco: Never Used    Tobacco comment: no vaping   Substance Use Topics    Alcohol use: No    Drug use: No       Outpatient Medications Marked as Taking for the 9/29/21 encounter (Office Visit) with Yariel Wen NP   Medication Sig Dispense Refill    liothyronine (CYTOMEL) 5 mcg tablet TAKE 2 TABLETS DAILY 180 Tablet 0    lansoprazole (PREVACID) 30 mg capsule TAKE 1 CAPSULE DAILY BEFOREBREAKFAST 90 Capsule 0    venlafaxine-SR (EFFEXOR-XR) 75 mg capsule Take 1 Capsule by mouth daily.  30 Capsule 0    propranolol LA (INDERAL LA) 60 mg SR capsule TAKE 1 CAPSULE DAILY 90 Cap 3    levothyroxine (synthroid) 50 mcg tablet TAKE 1 TABLET DAILY BEFORE BREAKFAST 90 Tab 3    hydrOXYzine HCL (ATARAX) 25 mg tablet Take 12.5 mg by mouth three (3) times daily as needed. Allergies   Allergen Reactions    Codeine Nausea Only    Morphine Itching and Nausea and Vomiting    Percocet [Oxycodone-Acetaminophen] Nausea and Vomiting    Sulfa (Sulfonamide Antibiotics) Itching    Vicodin [Hydrocodone-Acetaminophen] Nausea and Vomiting       Health Maintenance reviewed       ROS:  Gen: no fatigue, no fever, no chills, no unexplained weight loss or weight gain  Eyes: no excessive tearing, itching, or discharge  Nose: +rhinorrhea, +sinus pain  Mouth: no oral lesions, no sore throat, no difficulty swallowing  Resp: no shortness of breath, no wheezing, no cough  CV: no chest pain, no orthopnea, no paroxysmal nocturnal dyspnea, no lower extremity edema, no palpitations  Abd: no nausea, no heartburn, no diarrhea, no constipation, no abdominal pain  Neuro: +sinus headache right now, no syncope or presyncopal episodes  Endo: no polyuria, no polydipsia. : no hematuria, no dysuria, no frequency, no incontinence  Heme: no lymphadenopathy, no easy bruising or bleeding, no night sweats  MSK: no joint pain or swelling    PE:  Visit Vitals  /85 (BP 1 Location: Left arm, BP Patient Position: Sitting, BP Cuff Size: Adult)   Pulse 76   Resp 18   Ht 5' 1\" (1.549 m)   Wt 188 lb (85.3 kg)   SpO2 98%   BMI 35.52 kg/m²     Gen: alert, oriented, no acute distress  Head: normocephalic, atraumatic  Ears: external auditory canals clear, TMs without erythema or effusion  Eyes: pupils equal round reactive to light, sclera clear, conjunctiva clear  Neck: symmetric normal sized thyroid, no carotid bruits, no jugular vein distention  Resp: no increase work of breathing, lungs clear to ausculation bilaterally, no wheezing, rales or rhonchi  CV: S1, S2 normal.  No murmurs, rubs, or gallops.     Abd: soft, not tender, not distended. No hepatosplenomegaly. Normal bowel sounds. No hernias. No abdominal or renal bruits. Neuro: cranial nerves intact, normal strength and movement in all extremities, and sensation intact and symmetric. Skin: no lesion or rash  Extremities: no cyanosis or edema    No results found for this visit on 09/29/21. Assessment/Plan:  Differential diagnosis and treatment options reviewed with patient who is in agreement with treatment plan as outlined below. ICD-10-CM ICD-9-CM    1. Hypothyroidism, unspecified type  E03.9 244.9 TSH 3RD GENERATION      CBC WITH AUTOMATED DIFF      T4, FREE      METABOLIC PANEL, COMPREHENSIVE      METABOLIC PANEL, COMPREHENSIVE      T4, FREE      CBC WITH AUTOMATED DIFF      TSH 3RD GENERATION   2. Vitamin D insufficiency  E55.9 268.9 VITAMIN D, 25 HYDROXY      VITAMIN D, 25 HYDROXY   3. Essential hypertension  I10 401.9    4. Screening, lipid  Z13.220 V77.91 LIPID PANEL      LIPID PANEL   5. Encounter for hepatitis C screening test for low risk patient  Z11.59 V73.89 HEPATITIS C AB      HEPATITIS C AB   6. Acute non-recurrent frontal sinusitis  J01.10 461.1 doxycycline (ADOXA) 100 mg tablet   7. Left flank pain  R10.9 789.09    8. History of kidney stones  Z87.442 V13.01 tamsulosin (Flomax) 0.4 mg capsule     BP at goal on recheck, little high initially. Avoid sudafed and dASH diet recommended. Labs today  Will treat for sinusitis. Continue saline flushes and allergy medicine  UA negative. flomax prescribed for possible stone, will follow up with urology if flank pain persists or worsens. Increase fluids. Discussed BMI and healthy weight. Encouraged patient to work to implement changes including diet high in raw fruits and vegetables, lean protein and good fats. Limit refined, processed carbohydrates and sugar. Encouraged regular exercise. Recommended regular cardiovascular exercise 3-6 times per week for 30-60 minutes daily.       I have discussed the diagnosis with the patient and the intended plan as seen in the above orders. The patient has received an after-visit summary and questions were answered concerning future plans. I have discussed medication side effects and warnings with the patient as well. The patient verbalizes understanding and agreement with the plan.

## 2021-09-30 LAB
25(OH)D3 SERPL-MCNC: 26.3 NG/ML (ref 30–100)
ALBUMIN SERPL-MCNC: 3.4 G/DL (ref 3.5–5)
ALBUMIN/GLOB SERPL: 1 {RATIO} (ref 1.1–2.2)
ALP SERPL-CCNC: 136 U/L (ref 45–117)
ALT SERPL-CCNC: 20 U/L (ref 12–78)
ANION GAP SERPL CALC-SCNC: 5 MMOL/L (ref 5–15)
AST SERPL-CCNC: 12 U/L (ref 15–37)
BASOPHILS # BLD: 0.1 K/UL (ref 0–0.1)
BASOPHILS NFR BLD: 1 % (ref 0–1)
BILIRUB SERPL-MCNC: 0.5 MG/DL (ref 0.2–1)
BUN SERPL-MCNC: 9 MG/DL (ref 6–20)
BUN/CREAT SERPL: 14 (ref 12–20)
CALCIUM SERPL-MCNC: 8.8 MG/DL (ref 8.5–10.1)
CHLORIDE SERPL-SCNC: 107 MMOL/L (ref 97–108)
CHOLEST SERPL-MCNC: 170 MG/DL
CO2 SERPL-SCNC: 27 MMOL/L (ref 21–32)
CREAT SERPL-MCNC: 0.64 MG/DL (ref 0.55–1.02)
DIFFERENTIAL METHOD BLD: ABNORMAL
EOSINOPHIL # BLD: 0 K/UL (ref 0–0.4)
EOSINOPHIL NFR BLD: 0 % (ref 0–7)
ERYTHROCYTE [DISTWIDTH] IN BLOOD BY AUTOMATED COUNT: 13.7 % (ref 11.5–14.5)
GLOBULIN SER CALC-MCNC: 3.5 G/DL (ref 2–4)
GLUCOSE SERPL-MCNC: 100 MG/DL (ref 65–100)
HCT VFR BLD AUTO: 42.2 % (ref 35–47)
HCV AB SERPL QL IA: NONREACTIVE
HDLC SERPL-MCNC: 34 MG/DL
HDLC SERPL: 5 {RATIO} (ref 0–5)
HGB BLD-MCNC: 14 G/DL (ref 11.5–16)
IMM GRANULOCYTES # BLD AUTO: 0.1 K/UL (ref 0–0.04)
IMM GRANULOCYTES NFR BLD AUTO: 1 % (ref 0–0.5)
LDLC SERPL CALC-MCNC: 89.8 MG/DL (ref 0–100)
LYMPHOCYTES # BLD: 2.6 K/UL (ref 0.8–3.5)
LYMPHOCYTES NFR BLD: 23 % (ref 12–49)
MCH RBC QN AUTO: 28.9 PG (ref 26–34)
MCHC RBC AUTO-ENTMCNC: 33.2 G/DL (ref 30–36.5)
MCV RBC AUTO: 87.2 FL (ref 80–99)
MONOCYTES # BLD: 0.7 K/UL (ref 0–1)
MONOCYTES NFR BLD: 7 % (ref 5–13)
NEUTS SEG # BLD: 7.8 K/UL (ref 1.8–8)
NEUTS SEG NFR BLD: 68 % (ref 32–75)
NRBC # BLD: 0 K/UL (ref 0–0.01)
NRBC BLD-RTO: 0 PER 100 WBC
PLATELET # BLD AUTO: 272 K/UL (ref 150–400)
PMV BLD AUTO: 10.2 FL (ref 8.9–12.9)
POTASSIUM SERPL-SCNC: 4 MMOL/L (ref 3.5–5.1)
PROT SERPL-MCNC: 6.9 G/DL (ref 6.4–8.2)
RBC # BLD AUTO: 4.84 M/UL (ref 3.8–5.2)
SODIUM SERPL-SCNC: 139 MMOL/L (ref 136–145)
T4 FREE SERPL-MCNC: 0.8 NG/DL (ref 0.8–1.5)
TRIGL SERPL-MCNC: 231 MG/DL (ref ?–150)
TSH SERPL DL<=0.05 MIU/L-ACNC: 0.54 UIU/ML (ref 0.36–3.74)
VLDLC SERPL CALC-MCNC: 46.2 MG/DL
WBC # BLD AUTO: 11.3 K/UL (ref 3.6–11)

## 2021-10-01 NOTE — PROGRESS NOTES
Sent to [facility]  Your labs are back. WBC is up a little, likely that sinus stuff you have going on. Vitamin d is little low. Should take vitamin d3 2000 units daily  Thyroid level looks okay. Let me know if you have any questions. Hope you are feeling better!    [facility] NP

## 2021-10-09 RX ORDER — MECLIZINE HYDROCHLORIDE 25 MG/1
25 TABLET ORAL
Qty: 30 TABLET | Refills: 0 | Status: SHIPPED | OUTPATIENT
Start: 2021-10-09 | End: 2021-10-14 | Stop reason: SDUPTHER

## 2021-10-14 ENCOUNTER — PATIENT MESSAGE (OUTPATIENT)
Dept: FAMILY MEDICINE CLINIC | Age: 46
End: 2021-10-14

## 2021-10-14 DIAGNOSIS — F34.1 DYSTHYMIA: ICD-10-CM

## 2021-10-14 RX ORDER — VENLAFAXINE HYDROCHLORIDE 75 MG/1
75 CAPSULE, EXTENDED RELEASE ORAL DAILY
Qty: 30 CAPSULE | Refills: 5 | Status: CANCELLED | OUTPATIENT
Start: 2021-10-14

## 2021-10-14 RX ORDER — MECLIZINE HYDROCHLORIDE 25 MG/1
25 TABLET ORAL
Qty: 30 TABLET | Refills: 0 | Status: SHIPPED | OUTPATIENT
Start: 2021-10-14 | End: 2021-10-24

## 2021-10-14 RX ORDER — VENLAFAXINE HYDROCHLORIDE 75 MG/1
75 CAPSULE, EXTENDED RELEASE ORAL DAILY
Qty: 90 CAPSULE | Refills: 2 | Status: SHIPPED | OUTPATIENT
Start: 2021-10-14 | End: 2021-11-08 | Stop reason: SDUPTHER

## 2021-10-14 NOTE — TELEPHONE ENCOUNTER
From: Chevy Navas  To: Ana Clement NP  Sent: 10/14/2021 10:37 AM EDT  Subject: Prescription Question    Rubén Lucas,  My insurance changed after I saw you and though prescriptions go through the same pharmacy for mail delivery, I have to have new Rx sent to the mail order. Would you mind sending the venlafaxine to [facility]? I have updated the insurance for them. I am having withdrawal symptoms, the dizziness and hot flashes horrible dreams insomnia, buzzing and rushing in my head and ears. I'm going to get it from [facility], I didnt realize that's where it was sent. But I will need the refills at [facility]. Let me know if you have questions!

## 2021-10-14 NOTE — TELEPHONE ENCOUNTER
Requested Prescriptions     Pending Prescriptions Disp Refills    meclizine (ANTIVERT) 25 mg tablet 30 Tablet 0     Sig: Take 1 Tablet by mouth three (3) times daily as needed for Dizziness for up to 10 days.      Last office visit: 9/29/2021

## 2021-11-08 NOTE — TELEPHONE ENCOUNTER
Refill Request (fax sent over)      Requested Prescriptions     Pending Prescriptions Disp Refills    venlafaxine-SR (EFFEXOR-XR) 75 mg capsule 90 Capsule 2     Sig: Take 1 Capsule by mouth daily.

## 2021-11-09 RX ORDER — VENLAFAXINE HYDROCHLORIDE 75 MG/1
75 CAPSULE, EXTENDED RELEASE ORAL DAILY
Qty: 90 CAPSULE | Refills: 2 | Status: SHIPPED | OUTPATIENT
Start: 2021-11-09

## 2021-11-16 DIAGNOSIS — K21.9 GASTROESOPHAGEAL REFLUX DISEASE WITHOUT ESOPHAGITIS: ICD-10-CM

## 2021-11-16 RX ORDER — LANSOPRAZOLE 30 MG/1
CAPSULE, DELAYED RELEASE ORAL
Qty: 90 CAPSULE | Refills: 2 | Status: SHIPPED | OUTPATIENT
Start: 2021-11-16 | End: 2022-09-14

## 2021-11-16 NOTE — TELEPHONE ENCOUNTER
Pharmacy is requesting a refill on med    Requested Prescriptions     Pending Prescriptions Disp Refills    lansoprazole (PREVACID) 30 mg capsule 90 Capsule 0

## 2021-12-16 NOTE — TELEPHONE ENCOUNTER
Refill RRequest (fax sent over)    Requested Prescriptions     Pending Prescriptions Disp Refills    liothyronine (CYTOMEL) 5 mcg tablet 180 Tablet 0     Sig: TAKE 2 TABLETS DAILY       Thank You

## 2021-12-17 RX ORDER — LIOTHYRONINE SODIUM 5 UG/1
TABLET ORAL
Qty: 180 TABLET | Refills: 0 | Status: SHIPPED | OUTPATIENT
Start: 2021-12-17 | End: 2022-04-29

## 2022-01-14 ENCOUNTER — VIRTUAL VISIT (OUTPATIENT)
Dept: FAMILY MEDICINE CLINIC | Age: 47
End: 2022-01-14
Payer: COMMERCIAL

## 2022-01-14 DIAGNOSIS — J06.9 UPPER RESPIRATORY TRACT INFECTION, UNSPECIFIED TYPE: ICD-10-CM

## 2022-01-14 DIAGNOSIS — R50.9 LOW GRADE FEVER: ICD-10-CM

## 2022-01-14 DIAGNOSIS — R51.9 SINUS HEADACHE: ICD-10-CM

## 2022-01-14 DIAGNOSIS — R52 BODY ACHES: ICD-10-CM

## 2022-01-14 DIAGNOSIS — H92.02 LEFT EAR PAIN: Primary | ICD-10-CM

## 2022-01-14 LAB
FLUAV+FLUBV AG NOSE QL IA.RAPID: NEGATIVE
FLUAV+FLUBV AG NOSE QL IA.RAPID: NEGATIVE
VALID INTERNAL CONTROL?: YES

## 2022-01-14 PROCEDURE — 99213 OFFICE O/P EST LOW 20 MIN: CPT | Performed by: NURSE PRACTITIONER

## 2022-01-14 RX ORDER — AMOXICILLIN AND CLAVULANATE POTASSIUM 875; 125 MG/1; MG/1
1 TABLET, FILM COATED ORAL EVERY 12 HOURS
Qty: 20 TABLET | Refills: 0 | Status: SHIPPED | OUTPATIENT
Start: 2022-01-14 | End: 2022-01-14 | Stop reason: SDUPTHER

## 2022-01-14 RX ORDER — FLUCONAZOLE 150 MG/1
150 TABLET ORAL DAILY
Qty: 2 TABLET | Refills: 0 | Status: SHIPPED | OUTPATIENT
Start: 2022-01-14 | End: 2022-01-14 | Stop reason: SDUPTHER

## 2022-01-14 RX ORDER — AMOXICILLIN AND CLAVULANATE POTASSIUM 875; 125 MG/1; MG/1
1 TABLET, FILM COATED ORAL EVERY 12 HOURS
Qty: 20 TABLET | Refills: 0 | Status: SHIPPED | OUTPATIENT
Start: 2022-01-14 | End: 2022-01-24

## 2022-01-14 RX ORDER — FLUCONAZOLE 150 MG/1
150 TABLET ORAL DAILY
Qty: 2 TABLET | Refills: 0 | Status: SHIPPED | OUTPATIENT
Start: 2022-01-14 | End: 2022-01-15

## 2022-01-14 NOTE — PROGRESS NOTES
1. Have you been to the ER, urgent care clinic since your last visit? Hospitalized since your last visit? No    2. Have you seen or consulted any other health care providers outside of the [address] [place] [address] since your last visit? Include any pap smears or colon screening. No    Health Maintenance Due   Topic Date Due    COVID-19 Vaccine (1) Never done    Cervical cancer screen  Never done    Colorectal Cancer Screening Combo  Never done    Flu Vaccine (1) 09/01/2021     Chief Complaint   Patient presents with    Ear Pain     (L) ear    Sore Throat    Shortness of Breath     Pt states she's been having some tightness in her chest     Pt states she been having (L) ear pain and swelling down the (L) side of her neck also for the last 2 weeks. Pt states she tested for covid yesterday and it resulted negative. Pt states she's only been taking Advil to help with discomfort. Pt states her temperature was 100 last night.

## 2022-03-02 RX ORDER — LEVOTHYROXINE SODIUM 50 UG/1
TABLET ORAL
Qty: 90 TABLET | Refills: 3 | Status: SHIPPED | OUTPATIENT
Start: 2022-03-02

## 2022-03-17 LAB — PAP SMEAR, EXTERNAL: NORMAL

## 2022-09-11 DIAGNOSIS — G43.009 MIGRAINE WITHOUT AURA AND WITHOUT STATUS MIGRAINOSUS, NOT INTRACTABLE: ICD-10-CM

## 2022-09-13 RX ORDER — PROPRANOLOL HYDROCHLORIDE 60 MG/1
60 CAPSULE, EXTENDED RELEASE ORAL DAILY
Qty: 90 CAPSULE | Refills: 3 | Status: SHIPPED | OUTPATIENT
Start: 2022-09-13

## 2022-09-14 DIAGNOSIS — K21.9 GASTROESOPHAGEAL REFLUX DISEASE WITHOUT ESOPHAGITIS: ICD-10-CM

## 2022-09-14 RX ORDER — LANSOPRAZOLE 30 MG/1
CAPSULE, DELAYED RELEASE ORAL
Qty: 90 CAPSULE | Refills: 2 | Status: SHIPPED | OUTPATIENT
Start: 2022-09-14

## 2023-02-27 RX ORDER — LEVOTHYROXINE SODIUM 50 UG/1
TABLET ORAL
Qty: 90 TABLET | Refills: 3 | Status: SHIPPED | OUTPATIENT
Start: 2023-02-27

## 2023-06-19 RX ORDER — LANSOPRAZOLE 30 MG/1
CAPSULE, DELAYED RELEASE ORAL
Qty: 90 CAPSULE | Refills: 2 | Status: SHIPPED | OUTPATIENT
Start: 2023-06-19

## 2023-06-28 ENCOUNTER — OFFICE VISIT (OUTPATIENT)
Age: 48
End: 2023-06-28
Payer: COMMERCIAL

## 2023-06-28 VITALS
DIASTOLIC BLOOD PRESSURE: 78 MMHG | OXYGEN SATURATION: 98 % | WEIGHT: 179 LBS | RESPIRATION RATE: 18 BRPM | HEIGHT: 61 IN | HEART RATE: 77 BPM | TEMPERATURE: 98 F | BODY MASS INDEX: 33.79 KG/M2 | SYSTOLIC BLOOD PRESSURE: 138 MMHG

## 2023-06-28 DIAGNOSIS — I10 PRIMARY HYPERTENSION: ICD-10-CM

## 2023-06-28 DIAGNOSIS — E03.9 ACQUIRED HYPOTHYROIDISM: ICD-10-CM

## 2023-06-28 DIAGNOSIS — E55.9 VITAMIN D DEFICIENCY, UNSPECIFIED: ICD-10-CM

## 2023-06-28 DIAGNOSIS — R10.11 RUQ ABDOMINAL PAIN: ICD-10-CM

## 2023-06-28 DIAGNOSIS — Z00.00 ENCOUNTER FOR WELL ADULT EXAM WITHOUT ABNORMAL FINDINGS: Primary | ICD-10-CM

## 2023-06-28 PROCEDURE — 99396 PREV VISIT EST AGE 40-64: CPT | Performed by: FAMILY MEDICINE

## 2023-06-28 PROCEDURE — 3075F SYST BP GE 130 - 139MM HG: CPT | Performed by: FAMILY MEDICINE

## 2023-06-28 PROCEDURE — 3078F DIAST BP <80 MM HG: CPT | Performed by: FAMILY MEDICINE

## 2023-06-28 SDOH — ECONOMIC STABILITY: FOOD INSECURITY: WITHIN THE PAST 12 MONTHS, YOU WORRIED THAT YOUR FOOD WOULD RUN OUT BEFORE YOU GOT MONEY TO BUY MORE.: NEVER TRUE

## 2023-06-28 SDOH — ECONOMIC STABILITY: INCOME INSECURITY: HOW HARD IS IT FOR YOU TO PAY FOR THE VERY BASICS LIKE FOOD, HOUSING, MEDICAL CARE, AND HEATING?: NOT HARD AT ALL

## 2023-06-28 SDOH — ECONOMIC STABILITY: HOUSING INSECURITY
IN THE LAST 12 MONTHS, WAS THERE A TIME WHEN YOU DID NOT HAVE A STEADY PLACE TO SLEEP OR SLEPT IN A SHELTER (INCLUDING NOW)?: NO

## 2023-06-28 SDOH — ECONOMIC STABILITY: FOOD INSECURITY: WITHIN THE PAST 12 MONTHS, THE FOOD YOU BOUGHT JUST DIDN'T LAST AND YOU DIDN'T HAVE MONEY TO GET MORE.: NEVER TRUE

## 2023-06-28 ASSESSMENT — ENCOUNTER SYMPTOMS
SINUS PRESSURE: 1
ABDOMINAL PAIN: 1

## 2023-06-28 ASSESSMENT — PATIENT HEALTH QUESTIONNAIRE - PHQ9
1. LITTLE INTEREST OR PLEASURE IN DOING THINGS: 0
SUM OF ALL RESPONSES TO PHQ QUESTIONS 1-9: 0
2. FEELING DOWN, DEPRESSED OR HOPELESS: 0
SUM OF ALL RESPONSES TO PHQ9 QUESTIONS 1 & 2: 0

## 2023-06-29 LAB
25(OH)D3 SERPL-MCNC: 35.7 NG/ML (ref 30–100)
ALBUMIN SERPL-MCNC: 3.8 G/DL (ref 3.5–5)
ALBUMIN/GLOB SERPL: 1.2 (ref 1.1–2.2)
ALP SERPL-CCNC: 142 U/L (ref 45–117)
ALT SERPL-CCNC: 27 U/L (ref 12–78)
ANION GAP SERPL CALC-SCNC: 3 MMOL/L (ref 5–15)
AST SERPL-CCNC: 16 U/L (ref 15–37)
BASOPHILS # BLD: 0.1 K/UL (ref 0–0.1)
BASOPHILS NFR BLD: 1 % (ref 0–1)
BILIRUB SERPL-MCNC: 0.3 MG/DL (ref 0.2–1)
BUN SERPL-MCNC: 11 MG/DL (ref 6–20)
BUN/CREAT SERPL: 17 (ref 12–20)
CALCIUM SERPL-MCNC: 9.1 MG/DL (ref 8.5–10.1)
CHLORIDE SERPL-SCNC: 107 MMOL/L (ref 97–108)
CHOLEST SERPL-MCNC: 164 MG/DL
CO2 SERPL-SCNC: 27 MMOL/L (ref 21–32)
CREAT SERPL-MCNC: 0.65 MG/DL (ref 0.55–1.02)
DIFFERENTIAL METHOD BLD: ABNORMAL
EOSINOPHIL # BLD: 0.2 K/UL (ref 0–0.4)
EOSINOPHIL NFR BLD: 2 % (ref 0–7)
ERYTHROCYTE [DISTWIDTH] IN BLOOD BY AUTOMATED COUNT: 13.2 % (ref 11.5–14.5)
GLOBULIN SER CALC-MCNC: 3.2 G/DL (ref 2–4)
GLUCOSE SERPL-MCNC: 96 MG/DL (ref 65–100)
HCT VFR BLD AUTO: 44.3 % (ref 35–47)
HDLC SERPL-MCNC: 34 MG/DL
HDLC SERPL: 4.8 (ref 0–5)
HGB BLD-MCNC: 14.4 G/DL (ref 11.5–16)
IMM GRANULOCYTES # BLD AUTO: 0.1 K/UL (ref 0–0.04)
IMM GRANULOCYTES NFR BLD AUTO: 1 % (ref 0–0.5)
LDLC SERPL CALC-MCNC: 87.6 MG/DL (ref 0–100)
LYMPHOCYTES # BLD: 3 K/UL (ref 0.8–3.5)
LYMPHOCYTES NFR BLD: 29 % (ref 12–49)
MCH RBC QN AUTO: 27.9 PG (ref 26–34)
MCHC RBC AUTO-ENTMCNC: 32.5 G/DL (ref 30–36.5)
MCV RBC AUTO: 85.9 FL (ref 80–99)
MONOCYTES # BLD: 0.6 K/UL (ref 0–1)
MONOCYTES NFR BLD: 6 % (ref 5–13)
NEUTS SEG # BLD: 6.3 K/UL (ref 1.8–8)
NEUTS SEG NFR BLD: 61 % (ref 32–75)
NRBC # BLD: 0 K/UL (ref 0–0.01)
NRBC BLD-RTO: 0 PER 100 WBC
PLATELET # BLD AUTO: 297 K/UL (ref 150–400)
PMV BLD AUTO: 10.3 FL (ref 8.9–12.9)
POTASSIUM SERPL-SCNC: 3.9 MMOL/L (ref 3.5–5.1)
PROT SERPL-MCNC: 7 G/DL (ref 6.4–8.2)
RBC # BLD AUTO: 5.16 M/UL (ref 3.8–5.2)
SODIUM SERPL-SCNC: 137 MMOL/L (ref 136–145)
TRIGL SERPL-MCNC: 212 MG/DL
TSH SERPL DL<=0.05 MIU/L-ACNC: 2.04 UIU/ML (ref 0.36–3.74)
VLDLC SERPL CALC-MCNC: 42.4 MG/DL
WBC # BLD AUTO: 10.1 K/UL (ref 3.6–11)

## 2023-06-30 LAB
EST. AVERAGE GLUCOSE BLD GHB EST-MCNC: 94 MG/DL
HBA1C MFR BLD: 4.9 % (ref 4–5.6)

## 2023-08-05 ENCOUNTER — APPOINTMENT (OUTPATIENT)
Facility: HOSPITAL | Age: 48
End: 2023-08-05
Payer: COMMERCIAL

## 2023-08-05 ENCOUNTER — HOSPITAL ENCOUNTER (EMERGENCY)
Facility: HOSPITAL | Age: 48
Discharge: HOME | End: 2023-08-05
Attending: EMERGENCY MEDICINE
Payer: COMMERCIAL

## 2023-08-05 VITALS
OXYGEN SATURATION: 100 % | RESPIRATION RATE: 20 BRPM | TEMPERATURE: 98.4 F | DIASTOLIC BLOOD PRESSURE: 91 MMHG | HEART RATE: 55 BPM | SYSTOLIC BLOOD PRESSURE: 131 MMHG

## 2023-08-05 DIAGNOSIS — S42.212A CLOSED DISPLACED FRACTURE OF SURGICAL NECK OF LEFT HUMERUS, UNSPECIFIED FRACTURE MORPHOLOGY, INITIAL ENCOUNTER: Primary | ICD-10-CM

## 2023-08-05 PROCEDURE — 6370000000 HC RX 637 (ALT 250 FOR IP): Performed by: EMERGENCY MEDICINE

## 2023-08-05 PROCEDURE — 99284 EMERGENCY DEPT VISIT MOD MDM: CPT

## 2023-08-05 PROCEDURE — 2580000003 HC RX 258: Performed by: EMERGENCY MEDICINE

## 2023-08-05 PROCEDURE — 96374 THER/PROPH/DIAG INJ IV PUSH: CPT

## 2023-08-05 PROCEDURE — 96376 TX/PRO/DX INJ SAME DRUG ADON: CPT

## 2023-08-05 PROCEDURE — 71250 CT THORAX DX C-: CPT

## 2023-08-05 PROCEDURE — 73060 X-RAY EXAM OF HUMERUS: CPT

## 2023-08-05 PROCEDURE — 96375 TX/PRO/DX INJ NEW DRUG ADDON: CPT

## 2023-08-05 PROCEDURE — 6360000002 HC RX W HCPCS: Performed by: EMERGENCY MEDICINE

## 2023-08-05 RX ORDER — PROMETHAZINE HYDROCHLORIDE 25 MG/1
25 TABLET ORAL 4 TIMES DAILY PRN
Qty: 20 TABLET | Refills: 0 | Status: SHIPPED | OUTPATIENT
Start: 2023-08-05 | End: 2023-08-12

## 2023-08-05 RX ORDER — OXYCODONE HYDROCHLORIDE 5 MG/1
5 TABLET ORAL EVERY 6 HOURS PRN
Qty: 12 TABLET | Refills: 0 | Status: SHIPPED | OUTPATIENT
Start: 2023-08-05 | End: 2023-08-08

## 2023-08-05 RX ORDER — DIPHENHYDRAMINE HYDROCHLORIDE 50 MG/ML
50 INJECTION INTRAMUSCULAR; INTRAVENOUS
Status: COMPLETED | OUTPATIENT
Start: 2023-08-05 | End: 2023-08-05

## 2023-08-05 RX ORDER — OXYCODONE HYDROCHLORIDE AND ACETAMINOPHEN 5; 325 MG/1; MG/1
1 TABLET ORAL
Status: COMPLETED | OUTPATIENT
Start: 2023-08-05 | End: 2023-08-05

## 2023-08-05 RX ORDER — PROCHLORPERAZINE EDISYLATE 5 MG/ML
10 INJECTION INTRAMUSCULAR; INTRAVENOUS EVERY 6 HOURS PRN
Status: DISCONTINUED | OUTPATIENT
Start: 2023-08-05 | End: 2023-08-06 | Stop reason: HOSPADM

## 2023-08-05 RX ORDER — HYDROMORPHONE HYDROCHLORIDE 1 MG/ML
1 INJECTION, SOLUTION INTRAMUSCULAR; INTRAVENOUS; SUBCUTANEOUS
Status: COMPLETED | OUTPATIENT
Start: 2023-08-05 | End: 2023-08-05

## 2023-08-05 RX ORDER — ONDANSETRON 2 MG/ML
8 INJECTION INTRAMUSCULAR; INTRAVENOUS ONCE
Status: COMPLETED | OUTPATIENT
Start: 2023-08-05 | End: 2023-08-05

## 2023-08-05 RX ORDER — 0.9 % SODIUM CHLORIDE 0.9 %
1000 INTRAVENOUS SOLUTION INTRAVENOUS ONCE
Status: COMPLETED | OUTPATIENT
Start: 2023-08-05 | End: 2023-08-05

## 2023-08-05 RX ADMIN — PROCHLORPERAZINE EDISYLATE 10 MG: 5 INJECTION INTRAMUSCULAR; INTRAVENOUS at 22:06

## 2023-08-05 RX ADMIN — ONDANSETRON HYDROCHLORIDE 8 MG: 2 SOLUTION INTRAMUSCULAR; INTRAVENOUS at 16:59

## 2023-08-05 RX ADMIN — HYDROMORPHONE HYDROCHLORIDE 1 MG: 1 INJECTION, SOLUTION INTRAMUSCULAR; INTRAVENOUS; SUBCUTANEOUS at 16:59

## 2023-08-05 RX ADMIN — DIPHENHYDRAMINE HYDROCHLORIDE 50 MG: 50 INJECTION INTRAMUSCULAR; INTRAVENOUS at 17:31

## 2023-08-05 RX ADMIN — OXYCODONE HYDROCHLORIDE AND ACETAMINOPHEN 1 TABLET: 5; 325 TABLET ORAL at 22:03

## 2023-08-05 RX ADMIN — PROCHLORPERAZINE EDISYLATE 10 MG: 5 INJECTION INTRAMUSCULAR; INTRAVENOUS at 17:31

## 2023-08-05 RX ADMIN — SODIUM CHLORIDE 1000 ML: 9 INJECTION, SOLUTION INTRAVENOUS at 16:58

## 2023-08-05 ASSESSMENT — PAIN SCALES - GENERAL: PAINLEVEL_OUTOF10: 8

## 2023-08-05 NOTE — ED PROVIDER NOTES
[facility] EMERGENCY DEPT  EMERGENCY DEPARTMENT ENCOUNTER       Pt Name: Kvng Estrada  MRN: [id number]  [address] [facility] 1975  Date of evaluation: 8/5/2023  Provider: Malick Coleman MD   PCP: Urban Gilford, APRN - NP  Note Started: 11:27 AM 8/5/23     CHIEF COMPLAINT       Chief Complaint   Patient presents with    Arm Injury     Patient fell only elbow while trying to put swing up. Pain in the upper arm. PMS Intact. HISTORY OF PRESENT ILLNESS: 1 or more elements      History From: Patient     Kvng Estrada is a 49 y.o. female who presents ambulatory for arm pain. Patient was outside on a stepladder attempting to hang a child swing when she fell landing on her elbow. She states she felt a crunch immediately. Did hit her head but did not lose consciousness. Complaining of only arm pain. Able to get up and walk. Nursing Notes were all reviewed and agreed with or any disagreements were addressed in the HPI. REVIEW OF SYSTEMS      Review of Systems   Constitutional:  Negative for activity change, appetite change, chills, fatigue and fever. HENT:  Negative for congestion. Eyes:  Negative for visual disturbance. Respiratory:  Negative for cough. Cardiovascular:  Negative for chest pain and leg swelling. Gastrointestinal:  Negative for abdominal pain, diarrhea and vomiting. Genitourinary:  Negative for dysuria. Musculoskeletal:  Positive for joint swelling. Negative for gait problem. Skin:  Negative for color change and rash. Neurological:  Negative for dizziness and weakness. Positives and Pertinent negatives as per HPI.     PAST HISTORY     Past Medical History:  Past Medical History:   Diagnosis Date    HTN (hypertension)     Migraine     Nausea & vomiting     Thyroid disease     hypothyroid         Past Surgical History:  Past Surgical History:   Procedure Laterality Date    CHOLECYSTECTOMY      DILATION AND CURETTAGE OF UTERUS      GI         Family History:  Family History signed)      (Please note that parts of this dictation were completed with voice recognition software. Quite often unanticipated grammatical, syntax, homophones, and other interpretive errors are inadvertently transcribed by the computer software. Please disregards these errors.  Please excuse any errors that have escaped final proofreading.)

## 2023-08-05 NOTE — ED NOTES
Patient provided with saltine crackers and ginger ale per request for PO challenge. Tolerating well at this time.       Rio Altamirano RN  08/05/23 8940

## 2023-08-06 NOTE — ED NOTES
DC info reviewed with patient, all questions answered. Patient well-appearing at time of discharge and vital signs stable. Ambulatory out of ED at this time.        Fanny Palacio RN  08/05/23 1222

## 2023-08-07 ENCOUNTER — TRANSCRIBE ORDERS (OUTPATIENT)
Facility: HOSPITAL | Age: 48
End: 2023-08-07

## 2023-08-07 ENCOUNTER — HOSPITAL ENCOUNTER (OUTPATIENT)
Facility: HOSPITAL | Age: 48
Discharge: HOME | End: 2023-08-10
Attending: ORTHOPAEDIC SURGERY
Payer: COMMERCIAL

## 2023-08-07 DIAGNOSIS — S42.292A CLOSED FRACTURE OF HEAD OF LEFT HUMERUS, INITIAL ENCOUNTER: Primary | ICD-10-CM

## 2023-08-07 DIAGNOSIS — S42.292A CLOSED FRACTURE OF HEAD OF LEFT HUMERUS, INITIAL ENCOUNTER: ICD-10-CM

## 2023-08-07 PROCEDURE — 73200 CT UPPER EXTREMITY W/O DYE: CPT

## 2023-08-07 ASSESSMENT — ENCOUNTER SYMPTOMS
DIARRHEA: 0
ABDOMINAL PAIN: 0
VOMITING: 0
COUGH: 0
COLOR CHANGE: 0

## 2023-08-28 RX ORDER — PROPRANOLOL HCL 60 MG
CAPSULE, EXTENDED RELEASE 24HR ORAL
Qty: 90 CAPSULE | Refills: 3 | Status: SHIPPED | OUTPATIENT
Start: 2023-08-28

## 2023-09-05 RX ORDER — LANSOPRAZOLE 30 MG/1
CAPSULE, DELAYED RELEASE ORAL
Qty: 90 CAPSULE | Refills: 2 | Status: SHIPPED | OUTPATIENT
Start: 2023-09-05

## 2023-10-07 ENCOUNTER — TELEPHONE (OUTPATIENT)
Age: 48
End: 2023-10-07

## 2023-10-07 RX ORDER — PANTOPRAZOLE SODIUM 40 MG/1
40 TABLET, DELAYED RELEASE ORAL
Qty: 90 TABLET | Refills: 1 | Status: SHIPPED | OUTPATIENT
Start: 2023-10-07

## 2023-10-07 NOTE — TELEPHONE ENCOUNTER
----- Message from Beverly MIA Telles sent at 10/7/2023 10:05 AM EDT -----  Regarding: Change lansoprazole rx  Contact: [phone]  Hi Dr. Catrina Riley,  My insurance stopped covering lansoprazole, they will only cover omeprazole or pantatorprazole, is there any way you could submit a new rx for one of those to [facility] rx for me? Thank you!

## 2024-02-02 RX ORDER — LEVOTHYROXINE SODIUM 0.05 MG/1
50 TABLET ORAL DAILY
Qty: 90 TABLET | Refills: 1 | Status: SHIPPED | OUTPATIENT
Start: 2024-02-02

## 2024-04-01 RX ORDER — PANTOPRAZOLE SODIUM 40 MG/1
40 TABLET, DELAYED RELEASE ORAL
Qty: 90 TABLET | Refills: 1 | Status: SHIPPED | OUTPATIENT
Start: 2024-04-01

## 2024-05-28 ENCOUNTER — TELEPHONE (OUTPATIENT)
Age: 49
End: 2024-05-28

## 2024-05-28 RX ORDER — LEVOTHYROXINE SODIUM 0.05 MG/1
50 TABLET ORAL DAILY
Qty: 90 TABLET | Refills: 1 | Status: SHIPPED | OUTPATIENT
Start: 2024-05-28

## 2024-05-28 NOTE — TELEPHONE ENCOUNTER
----- Message from Roly Cabral sent at 5/27/2024  7:05 AM EDT -----  Regarding: Liothyronine medication refill  Contact: [phone]  Hi Dr. Ruiz,  I am not able to request liothyronine through my chart for refill.  And I need to have that ordered please.    My insurance is now through [employer], and with the previous medication order of pantoprazole I put all the info there.  All prescriptions should go to   [facility] pharmacy   [address] [place] [address] [address] [place] [address].  Thank you so much!

## 2024-06-10 ENCOUNTER — TELEPHONE (OUTPATIENT)
Age: 49
End: 2024-06-10

## 2024-06-10 RX ORDER — LEVOTHYROXINE SODIUM 0.05 MG/1
50 TABLET ORAL DAILY
Qty: 90 TABLET | Refills: 1 | Status: SHIPPED | OUTPATIENT
Start: 2024-06-10

## 2024-06-10 RX ORDER — LIOTHYRONINE SODIUM 5 UG/1
10 TABLET ORAL DAILY
Qty: 180 TABLET | Refills: 1 | Status: SHIPPED | OUTPATIENT
Start: 2024-06-10 | End: 2024-12-07

## 2024-06-11 NOTE — TELEPHONE ENCOUNTER
----- Message from Tami Diggs MA sent at 6/10/2024  8:47 AM EDT -----  Regarding: FW: [facility] pharmacy info  Contact: [phone]    ----- Message -----  From: Roly Cabral  Sent: 6/9/2024   9:40 AM EDT  To: #  Subject: [facility] pharmacy info                              Good morning!  I had been sent a message asking for the pharmacy info...I had included it in the request...but this is what is on the bottle from pharmacy...  [facility] [address] [facility]  [address]  [place] [address]  Phone     This is for the 2 Rx I had requested. Liothyronine 5 mcg 2 tabs 1 x a day. And the levothyroxine synthroid 50 mg 1 tab 1 x a day.    I got a message that said the synthroid was filled...but a question about the pharmacy for the other rx... so I dont know if that one was sent or not.

## 2024-08-05 RX ORDER — PROPRANOLOL HCL 60 MG
60 CAPSULE, EXTENDED RELEASE 24HR ORAL DAILY
Qty: 90 CAPSULE | Refills: 3 | OUTPATIENT
Start: 2024-08-05

## 2024-08-22 RX ORDER — PANTOPRAZOLE SODIUM 40 MG/1
40 TABLET, DELAYED RELEASE ORAL
Qty: 90 TABLET | Refills: 3 | OUTPATIENT
Start: 2024-08-22

## 2024-08-26 RX ORDER — PANTOPRAZOLE SODIUM 40 MG/1
40 TABLET, DELAYED RELEASE ORAL
Qty: 90 TABLET | Refills: 0 | Status: SHIPPED | OUTPATIENT
Start: 2024-08-26

## 2024-08-26 RX ORDER — PROPRANOLOL HCL 60 MG
60 CAPSULE, EXTENDED RELEASE 24HR ORAL DAILY
Qty: 90 CAPSULE | Refills: 0 | Status: SHIPPED | OUTPATIENT
Start: 2024-08-26

## 2024-08-26 NOTE — TELEPHONE ENCOUNTER
Pt is calling requesting a refill on med apt made      propranolol (INDERAL LA) 60 MG extended release capsule        pantoprazole (PROTONIX) 40 MG tablet

## 2024-09-04 ENCOUNTER — OFFICE VISIT (OUTPATIENT)
Age: 49
End: 2024-09-04
Payer: COMMERCIAL

## 2024-09-04 VITALS
OXYGEN SATURATION: 98 % | SYSTOLIC BLOOD PRESSURE: 120 MMHG | RESPIRATION RATE: 18 BRPM | TEMPERATURE: 98.3 F | HEART RATE: 66 BPM | DIASTOLIC BLOOD PRESSURE: 85 MMHG | BODY MASS INDEX: 34.4 KG/M2 | WEIGHT: 182.2 LBS | HEIGHT: 61 IN

## 2024-09-04 DIAGNOSIS — E55.9 VITAMIN D DEFICIENCY, UNSPECIFIED: ICD-10-CM

## 2024-09-04 DIAGNOSIS — E03.9 ACQUIRED HYPOTHYROIDISM: ICD-10-CM

## 2024-09-04 DIAGNOSIS — I10 PRIMARY HYPERTENSION: Primary | ICD-10-CM

## 2024-09-04 PROCEDURE — 3074F SYST BP LT 130 MM HG: CPT | Performed by: FAMILY MEDICINE

## 2024-09-04 PROCEDURE — 99214 OFFICE O/P EST MOD 30 MIN: CPT | Performed by: FAMILY MEDICINE

## 2024-09-04 PROCEDURE — 3079F DIAST BP 80-89 MM HG: CPT | Performed by: FAMILY MEDICINE

## 2024-09-04 RX ORDER — LEVOTHYROXINE SODIUM 50 UG/1
50 TABLET ORAL DAILY
Qty: 90 TABLET | Refills: 3 | Status: SHIPPED | OUTPATIENT
Start: 2024-09-04

## 2024-09-04 RX ORDER — PROPRANOLOL HCL 60 MG
60 CAPSULE, EXTENDED RELEASE 24HR ORAL DAILY
Qty: 90 CAPSULE | Refills: 3 | Status: SHIPPED | OUTPATIENT
Start: 2024-09-04

## 2024-09-04 RX ORDER — LIOTHYRONINE SODIUM 5 UG/1
10 TABLET ORAL DAILY
Qty: 180 TABLET | Refills: 3 | Status: SHIPPED | OUTPATIENT
Start: 2024-09-04 | End: 2025-08-30

## 2024-09-04 RX ORDER — PANTOPRAZOLE SODIUM 40 MG/1
40 TABLET, DELAYED RELEASE ORAL
Qty: 90 TABLET | Refills: 3 | Status: SHIPPED | OUTPATIENT
Start: 2024-09-04

## 2024-09-04 SDOH — ECONOMIC STABILITY: FOOD INSECURITY: WITHIN THE PAST 12 MONTHS, THE FOOD YOU BOUGHT JUST DIDN'T LAST AND YOU DIDN'T HAVE MONEY TO GET MORE.: NEVER TRUE

## 2024-09-04 SDOH — ECONOMIC STABILITY: FOOD INSECURITY: WITHIN THE PAST 12 MONTHS, YOU WORRIED THAT YOUR FOOD WOULD RUN OUT BEFORE YOU GOT MONEY TO BUY MORE.: NEVER TRUE

## 2024-09-04 SDOH — ECONOMIC STABILITY: INCOME INSECURITY: HOW HARD IS IT FOR YOU TO PAY FOR THE VERY BASICS LIKE FOOD, HOUSING, MEDICAL CARE, AND HEATING?: NOT HARD AT ALL

## 2024-09-04 ASSESSMENT — PATIENT HEALTH QUESTIONNAIRE - PHQ9
SUM OF ALL RESPONSES TO PHQ9 QUESTIONS 1 & 2: 2
SUM OF ALL RESPONSES TO PHQ QUESTIONS 1-9: 2
SUM OF ALL RESPONSES TO PHQ QUESTIONS 1-9: 2
1. LITTLE INTEREST OR PLEASURE IN DOING THINGS: SEVERAL DAYS
SUM OF ALL RESPONSES TO PHQ QUESTIONS 1-9: 2
2. FEELING DOWN, DEPRESSED OR HOPELESS: SEVERAL DAYS
SUM OF ALL RESPONSES TO PHQ QUESTIONS 1-9: 2

## 2024-09-04 NOTE — PROGRESS NOTES
Chief Complaint   Patient presents with    Medication Check         Health Maintenance Due   Topic Date Due    Hepatitis B vaccine (1 of 3 - 19+ 3-dose series) Never done    Breast cancer screen  Never done    Depression Screen  06/28/2024    Flu vaccine (1) Never done    COVID-19 Vaccine (1 - 2023-24 season) Never done         \"Have you been to the ER, urgent care clinic since your last visit?  Hospitalized since your last visit?\"    ER    “Have you seen or consulted any other health care providers outside of [facility] since your last visit?”    Orthopedic       Have you had a mammogram?”   NO    No breast cancer screening on file

## 2024-09-04 NOTE — PROGRESS NOTES
Chief Complaint   Patient presents with    Medication Check     Pt lost her , has been working through grief.     Pt has been taking Xiomara's Wart and Ayan.     Pt would prefer to not be on a daily medication.     Pt is working to find a counselor.     Subjective: (As above and below)     Chief Complaint   Patient presents with    Medication Check     she is a 48 y.o. year old female who presents for evaluation.    Reviewed PmHx, RxHx, FmHx, SocHx, AllgHx and updated in chart.    Review of Systems - negative except as listed above    Objective:     Vitals:    09/04/24 1151   BP: 120/85   Site: Right Upper Arm   Position: Sitting   Cuff Size: Large Adult   Pulse: 66   Resp: 18   Temp: 98.3 °F (36.8 °C)   TempSrc: Temporal   SpO2: 98%   Weight: 82.6 kg (182 lb 3.2 oz)   Height: 1.549 m (5' 1\")     Physical Examination: General appearance - alert, well appearing, and in no distress  Mental status - normal mood, behavior, speech, dress, motor activity, and thought processes  Ears - bilateral TM's and external ear canals normal  Chest - clear to auscultation, no wheezes, rales or rhonchi, symmetric air entry  Heart - normal rate, regular rhythm, normal S1, S2, no murmurs, rubs, clicks or gallops  Musculoskeletal - no joint tenderness, deformity or swelling  Extremities - peripheral pulses normal, no pedal edema, no clubbing or cyanosis    Assessment/ Plan:   1. Primary hypertension  -check labs, well controlled  - Comprehensive Metabolic Panel; Future  - CBC with Auto Differential; Future  - Hemoglobin A1C; Future  - Lipid Panel; Future    2. Acquired hypothyroidism  - TSH; Future    3. Vitamin D deficiency, unspecified  - Vitamin D 25 Hydroxy; Future       Return if symptoms worsen or fail to improve.    I have discussed the diagnosis with the patient and the intended plan as seen in the above orders.  The patient has received an after-visit summary and questions were answered concerning future plans.

## 2024-09-05 LAB
25(OH)D3 SERPL-MCNC: 22.7 NG/ML (ref 30–100)
ALBUMIN SERPL-MCNC: 3.7 G/DL (ref 3.5–5)
ALBUMIN/GLOB SERPL: 1.2 (ref 1.1–2.2)
ALP SERPL-CCNC: 145 U/L (ref 45–117)
ALT SERPL-CCNC: 21 U/L (ref 12–78)
ANION GAP SERPL CALC-SCNC: 5 MMOL/L (ref 2–12)
AST SERPL-CCNC: 18 U/L (ref 15–37)
BASOPHILS # BLD: 0.1 K/UL (ref 0–0.1)
BASOPHILS NFR BLD: 1 % (ref 0–1)
BILIRUB SERPL-MCNC: 0.3 MG/DL (ref 0.2–1)
BUN SERPL-MCNC: 12 MG/DL (ref 6–20)
BUN/CREAT SERPL: 18 (ref 12–20)
CALCIUM SERPL-MCNC: 9.4 MG/DL (ref 8.5–10.1)
CHLORIDE SERPL-SCNC: 107 MMOL/L (ref 97–108)
CHOLEST SERPL-MCNC: 166 MG/DL
CO2 SERPL-SCNC: 27 MMOL/L (ref 21–32)
CREAT SERPL-MCNC: 0.68 MG/DL (ref 0.55–1.02)
DIFFERENTIAL METHOD BLD: ABNORMAL
EOSINOPHIL # BLD: 0.1 K/UL (ref 0–0.4)
EOSINOPHIL NFR BLD: 1 % (ref 0–7)
ERYTHROCYTE [DISTWIDTH] IN BLOOD BY AUTOMATED COUNT: 13.7 % (ref 11.5–14.5)
EST. AVERAGE GLUCOSE BLD GHB EST-MCNC: 103 MG/DL
GLOBULIN SER CALC-MCNC: 3.2 G/DL (ref 2–4)
GLUCOSE SERPL-MCNC: 123 MG/DL (ref 65–100)
HBA1C MFR BLD: 5.2 % (ref 4–5.6)
HCT VFR BLD AUTO: 43.7 % (ref 35–47)
HDLC SERPL-MCNC: 46 MG/DL
HDLC SERPL: 3.6 (ref 0–5)
HGB BLD-MCNC: 14 G/DL (ref 11.5–16)
IMM GRANULOCYTES # BLD AUTO: 0.1 K/UL (ref 0–0.04)
IMM GRANULOCYTES NFR BLD AUTO: 1 % (ref 0–0.5)
LDLC SERPL CALC-MCNC: 77.6 MG/DL (ref 0–100)
LYMPHOCYTES # BLD: 2.5 K/UL (ref 0.8–3.5)
LYMPHOCYTES NFR BLD: 25 % (ref 12–49)
MCH RBC QN AUTO: 28.1 PG (ref 26–34)
MCHC RBC AUTO-ENTMCNC: 32 G/DL (ref 30–36.5)
MCV RBC AUTO: 87.6 FL (ref 80–99)
MONOCYTES # BLD: 0.5 K/UL (ref 0–1)
MONOCYTES NFR BLD: 4 % (ref 5–13)
NEUTS SEG # BLD: 7 K/UL (ref 1.8–8)
NEUTS SEG NFR BLD: 68 % (ref 32–75)
NRBC # BLD: 0 K/UL (ref 0–0.01)
NRBC BLD-RTO: 0 PER 100 WBC
PLATELET # BLD AUTO: 295 K/UL (ref 150–400)
PMV BLD AUTO: 10.6 FL (ref 8.9–12.9)
POTASSIUM SERPL-SCNC: 4.3 MMOL/L (ref 3.5–5.1)
PROT SERPL-MCNC: 6.9 G/DL (ref 6.4–8.2)
RBC # BLD AUTO: 4.99 M/UL (ref 3.8–5.2)
SODIUM SERPL-SCNC: 139 MMOL/L (ref 136–145)
TRIGL SERPL-MCNC: 212 MG/DL
TSH SERPL DL<=0.05 MIU/L-ACNC: 3.15 UIU/ML (ref 0.36–3.74)
VLDLC SERPL CALC-MCNC: 42.4 MG/DL
WBC # BLD AUTO: 10.3 K/UL (ref 3.6–11)

## 2024-09-15 ENCOUNTER — TELEPHONE (OUTPATIENT)
Age: 49
End: 2024-09-15

## 2024-09-15 DIAGNOSIS — F43.21 GRIEF: Primary | ICD-10-CM

## 2024-09-15 DIAGNOSIS — F43.21 GRIEF: ICD-10-CM

## 2024-09-15 RX ORDER — HYDROXYZINE PAMOATE 25 MG/1
25 CAPSULE ORAL 2 TIMES DAILY PRN
Qty: 60 CAPSULE | Refills: 1 | Status: SHIPPED | OUTPATIENT
Start: 2024-09-15 | End: 2024-09-15 | Stop reason: SDUPTHER

## 2024-09-15 RX ORDER — HYDROXYZINE PAMOATE 25 MG/1
25 CAPSULE ORAL 2 TIMES DAILY PRN
Qty: 60 CAPSULE | Refills: 1 | Status: SHIPPED | OUTPATIENT
Start: 2024-09-15 | End: 2024-11-14

## 2025-01-14 ENCOUNTER — TELEMEDICINE (OUTPATIENT)
Age: 50
End: 2025-01-14
Payer: COMMERCIAL

## 2025-01-14 DIAGNOSIS — F43.10 PTSD (POST-TRAUMATIC STRESS DISORDER): Primary | ICD-10-CM

## 2025-01-14 PROCEDURE — 99214 OFFICE O/P EST MOD 30 MIN: CPT | Performed by: FAMILY MEDICINE

## 2025-01-14 RX ORDER — FLUOXETINE 10 MG/1
10 CAPSULE ORAL DAILY
Qty: 30 CAPSULE | Refills: 3 | Status: SHIPPED | OUTPATIENT
Start: 2025-01-14 | End: 2025-01-14 | Stop reason: SDUPTHER

## 2025-01-14 RX ORDER — FLUOXETINE 10 MG/1
10 CAPSULE ORAL DAILY
Qty: 90 CAPSULE | Refills: 1 | Status: SHIPPED | OUTPATIENT
Start: 2025-01-14

## 2025-01-14 SDOH — ECONOMIC STABILITY: INCOME INSECURITY: IN THE LAST 12 MONTHS, WAS THERE A TIME WHEN YOU WERE NOT ABLE TO PAY THE MORTGAGE OR RENT ON TIME?: NO

## 2025-01-14 SDOH — ECONOMIC STABILITY: FOOD INSECURITY: WITHIN THE PAST 12 MONTHS, YOU WORRIED THAT YOUR FOOD WOULD RUN OUT BEFORE YOU GOT MONEY TO BUY MORE.: NEVER TRUE

## 2025-01-14 SDOH — ECONOMIC STABILITY: FOOD INSECURITY: WITHIN THE PAST 12 MONTHS, THE FOOD YOU BOUGHT JUST DIDN'T LAST AND YOU DIDN'T HAVE MONEY TO GET MORE.: NEVER TRUE

## 2025-01-14 SDOH — ECONOMIC STABILITY: TRANSPORTATION INSECURITY
IN THE PAST 12 MONTHS, HAS THE LACK OF TRANSPORTATION KEPT YOU FROM MEDICAL APPOINTMENTS OR FROM GETTING MEDICATIONS?: NO

## 2025-01-14 SDOH — ECONOMIC STABILITY: TRANSPORTATION INSECURITY
IN THE PAST 12 MONTHS, HAS LACK OF TRANSPORTATION KEPT YOU FROM MEETINGS, WORK, OR FROM GETTING THINGS NEEDED FOR DAILY LIVING?: NO

## 2025-01-14 ASSESSMENT — COLUMBIA-SUICIDE SEVERITY RATING SCALE - C-SSRS
1. WITHIN THE PAST MONTH, HAVE YOU WISHED YOU WERE DEAD OR WISHED YOU COULD GO TO SLEEP AND NOT WAKE UP?: NO
6. HAVE YOU EVER DONE ANYTHING, STARTED TO DO ANYTHING, OR PREPARED TO DO ANYTHING TO END YOUR LIFE?: NO
2. HAVE YOU ACTUALLY HAD ANY THOUGHTS OF KILLING YOURSELF?: NO

## 2025-01-14 ASSESSMENT — PATIENT HEALTH QUESTIONNAIRE - PHQ9
4. FEELING TIRED OR HAVING LITTLE ENERGY: SEVERAL DAYS
SUM OF ALL RESPONSES TO PHQ QUESTIONS 1-9: 12
5. POOR APPETITE OR OVEREATING: MORE THAN HALF THE DAYS
10. IF YOU CHECKED OFF ANY PROBLEMS, HOW DIFFICULT HAVE THESE PROBLEMS MADE IT FOR YOU TO DO YOUR WORK, TAKE CARE OF THINGS AT HOME, OR GET ALONG WITH OTHER PEOPLE: SOMEWHAT DIFFICULT
2. FEELING DOWN, DEPRESSED OR HOPELESS: MORE THAN HALF THE DAYS
9. THOUGHTS THAT YOU WOULD BE BETTER OFF DEAD, OR OF HURTING YOURSELF: SEVERAL DAYS
SUM OF ALL RESPONSES TO PHQ QUESTIONS 1-9: 12
SUM OF ALL RESPONSES TO PHQ9 QUESTIONS 1 & 2: 4
7. TROUBLE CONCENTRATING ON THINGS, SUCH AS READING THE NEWSPAPER OR WATCHING TELEVISION: SEVERAL DAYS
SUM OF ALL RESPONSES TO PHQ QUESTIONS 1-9: 12
3. TROUBLE FALLING OR STAYING ASLEEP: SEVERAL DAYS
1. LITTLE INTEREST OR PLEASURE IN DOING THINGS: MORE THAN HALF THE DAYS
SUM OF ALL RESPONSES TO PHQ QUESTIONS 1-9: 11
8. MOVING OR SPEAKING SO SLOWLY THAT OTHER PEOPLE COULD HAVE NOTICED. OR THE OPPOSITE, BEING SO FIGETY OR RESTLESS THAT YOU HAVE BEEN MOVING AROUND A LOT MORE THAN USUAL: SEVERAL DAYS
6. FEELING BAD ABOUT YOURSELF - OR THAT YOU ARE A FAILURE OR HAVE LET YOURSELF OR YOUR FAMILY DOWN: SEVERAL DAYS

## 2025-01-14 NOTE — PROGRESS NOTES
The patient identity was confirmed with  and First/Last Name. Medications and Allergies reviewed with patient, as well as any new diagnosis/procedures. Depression Screening and SDOH Screening done today.    Chief Complaint   Patient presents with    Depression          2025    10:37 AM   Patient-Reported Vitals   Patient-Reported Weight 180   Patient-Reported Height 5 ft 1 inch   Patient-Reported Systolic 136 mmHg   Patient-Reported Diastolic 73 mmHg   Patient-Reported Pulse 80   Patient-Reported Temperature 97.6   Patient-Reported SpO2 100       Health Maintenance Due   Topic Date Due    Hepatitis B vaccine (1 of 3 - 19+ 3-dose series) Never done    Breast cancer screen  Never done    Flu vaccine (1) Never done    COVID-19 Vaccine (2023- season) Never done    Shingles vaccine (1 of 2) Never done          \"Have you been to the ER, urgent care clinic since your last visit?  Hospitalized since your last visit?\"    NO    “Have you seen or consulted any other health care providers outside our system since your last visit?”    NO    Have you had a mammogram?”   YES - Where: Oct/2014 @ [facility] [place]. Nurse/CMA to request most recent records if not in the chart    No breast cancer screening on file

## 2025-01-14 NOTE — PROGRESS NOTES
Chief Complaint   Patient presents with    Depression     Pt was seen via virtual video visit.     Pt reports that she was struggling with increased anxiety and depression last month, attributes some due to the holidays.     Pt reports that vistaril makes her very drowsy, would take in the evening, side effects would linger until the next morning.     Pt reports that her thoughts were all over the place, very jumpy, some trouble breathing, hard to get a deep breath.   +headaches - not migraines, would last all day, 3-4 times per week  +CP - consistent throughout the day, lasted about a week or two, BP was elevated 160-180/, pulse over 90  +difficulty sleeping    Pt broke her arm in 2023, seemed to get rashes over areas where hardware was placed.   Then developed rashes where she bruised her other arm.   Then developed rash under bracelet and rings     Pt reports that the month of December she had no pleasure in anything, hard to motivate. Wanted to stay in bed.   Pt reports that she has been looking for another counselor, has an appt in February    Pt has been reluctant to start a daily medication due to past difficulty with weaning off of Effexor.     Pt tried gurpreet, [facility], worked for a short time.     Pt has also been on prozac and paxil in the remote past.     2025    TELEHEALTH EVALUATION -- Audio/Visual    HPI:    Roly Cabral (:  1975) has requested an audio/video evaluation for the following concern(s):    As above    Review of Systems   Psychiatric/Behavioral:  Positive for sleep disturbance. The patient is nervous/anxious.    All other systems reviewed and are negative.      Prior to Visit Medications    Medication Sig Taking? Authorizing Provider   FLUoxetine (PROZAC) 10 MG capsule Take 1 capsule by mouth daily Yes Albert Ruiz MD   propranolol (INDERAL LA) 60 MG extended release capsule Take 1 capsule by mouth daily Yes Albert Ruiz MD   pantoprazole

## 2025-06-05 DIAGNOSIS — F43.10 PTSD (POST-TRAUMATIC STRESS DISORDER): ICD-10-CM

## 2025-06-05 RX ORDER — FLUOXETINE 10 MG/1
10 CAPSULE ORAL DAILY
Qty: 90 CAPSULE | Refills: 3 | Status: SHIPPED | OUTPATIENT
Start: 2025-06-05

## 2025-07-17 RX ORDER — LIOTHYRONINE SODIUM 5 UG/1
10 TABLET ORAL DAILY
Qty: 180 TABLET | Refills: 3 | Status: SHIPPED | OUTPATIENT
Start: 2025-07-17 | End: 2026-07-12

## 2025-07-17 RX ORDER — LEVOTHYROXINE SODIUM 50 UG/1
50 TABLET ORAL DAILY
Qty: 90 TABLET | Refills: 3 | Status: SHIPPED | OUTPATIENT
Start: 2025-07-17

## 2025-07-31 RX ORDER — PANTOPRAZOLE SODIUM 40 MG/1
40 TABLET, DELAYED RELEASE ORAL
Qty: 90 TABLET | Refills: 3 | Status: SHIPPED | OUTPATIENT
Start: 2025-07-31

## 2025-09-01 RX ORDER — PROPRANOLOL HYDROCHLORIDE 60 MG/1
60 CAPSULE, EXTENDED RELEASE ORAL DAILY
Qty: 90 CAPSULE | Refills: 0 | Status: SHIPPED | OUTPATIENT
Start: 2025-09-01

## 2025-09-01 RX ORDER — PROPRANOLOL HYDROCHLORIDE 60 MG/1
60 CAPSULE, EXTENDED RELEASE ORAL DAILY
Qty: 90 CAPSULE | Refills: 3 | Status: SHIPPED | OUTPATIENT
Start: 2025-09-01